# Patient Record
Sex: MALE | Race: WHITE | NOT HISPANIC OR LATINO
[De-identification: names, ages, dates, MRNs, and addresses within clinical notes are randomized per-mention and may not be internally consistent; named-entity substitution may affect disease eponyms.]

---

## 2017-01-13 ENCOUNTER — APPOINTMENT (OUTPATIENT)
Dept: SURGERY | Facility: CLINIC | Age: 57
End: 2017-01-13

## 2017-01-13 VITALS
BODY MASS INDEX: 26.34 KG/M2 | HEIGHT: 70 IN | OXYGEN SATURATION: 98 % | WEIGHT: 184.03 LBS | HEART RATE: 70 BPM | RESPIRATION RATE: 16 BRPM | DIASTOLIC BLOOD PRESSURE: 76 MMHG | TEMPERATURE: 98 F | SYSTOLIC BLOOD PRESSURE: 149 MMHG

## 2017-01-20 ENCOUNTER — APPOINTMENT (OUTPATIENT)
Dept: SURGERY | Facility: CLINIC | Age: 57
End: 2017-01-20

## 2017-01-20 VITALS
TEMPERATURE: 98.7 F | BODY MASS INDEX: 26.63 KG/M2 | RESPIRATION RATE: 16 BRPM | DIASTOLIC BLOOD PRESSURE: 69 MMHG | OXYGEN SATURATION: 99 % | WEIGHT: 186.03 LBS | HEART RATE: 60 BPM | HEIGHT: 70 IN | SYSTOLIC BLOOD PRESSURE: 148 MMHG

## 2017-02-03 ENCOUNTER — APPOINTMENT (OUTPATIENT)
Dept: SURGERY | Facility: CLINIC | Age: 57
End: 2017-02-03

## 2017-02-03 VITALS
WEIGHT: 185.03 LBS | DIASTOLIC BLOOD PRESSURE: 68 MMHG | BODY MASS INDEX: 26.49 KG/M2 | HEIGHT: 70 IN | HEART RATE: 65 BPM | RESPIRATION RATE: 16 BRPM | OXYGEN SATURATION: 98 % | TEMPERATURE: 98.1 F | SYSTOLIC BLOOD PRESSURE: 144 MMHG

## 2017-03-03 ENCOUNTER — APPOINTMENT (OUTPATIENT)
Dept: SURGERY | Facility: CLINIC | Age: 57
End: 2017-03-03

## 2017-03-03 ENCOUNTER — APPOINTMENT (OUTPATIENT)
Dept: VASCULAR SURGERY | Facility: CLINIC | Age: 57
End: 2017-03-03

## 2017-03-03 VITALS
DIASTOLIC BLOOD PRESSURE: 63 MMHG | OXYGEN SATURATION: 99 % | HEART RATE: 57 BPM | HEIGHT: 70 IN | SYSTOLIC BLOOD PRESSURE: 152 MMHG | WEIGHT: 178 LBS | TEMPERATURE: 98.3 F | BODY MASS INDEX: 25.48 KG/M2 | RESPIRATION RATE: 16 BRPM

## 2017-04-12 ENCOUNTER — APPOINTMENT (OUTPATIENT)
Dept: SURGERY | Facility: CLINIC | Age: 57
End: 2017-04-12

## 2017-04-12 VITALS
RESPIRATION RATE: 16 BRPM | OXYGEN SATURATION: 94 % | BODY MASS INDEX: 25.62 KG/M2 | DIASTOLIC BLOOD PRESSURE: 74 MMHG | SYSTOLIC BLOOD PRESSURE: 147 MMHG | WEIGHT: 179 LBS | TEMPERATURE: 98.4 F | HEIGHT: 70 IN | HEART RATE: 84 BPM

## 2017-04-12 DIAGNOSIS — K43.2 INCISIONAL HERNIA W/OUT OBSTRUCTION OR GANGRENE: ICD-10-CM

## 2017-04-12 DIAGNOSIS — K42.9 UMBILICAL HERNIA W/OUT OBSTRUCTION OR GANGRENE: ICD-10-CM

## 2017-10-09 ENCOUNTER — APPOINTMENT (OUTPATIENT)
Dept: SURGERY | Facility: CLINIC | Age: 57
End: 2017-10-09

## 2018-02-01 ENCOUNTER — EMERGENCY (EMERGENCY)
Facility: HOSPITAL | Age: 58
LOS: 1 days | Discharge: HOME | End: 2018-02-01
Attending: EMERGENCY MEDICINE | Admitting: EMERGENCY MEDICINE
Payer: MEDICAID

## 2018-02-01 VITALS
HEART RATE: 81 BPM | SYSTOLIC BLOOD PRESSURE: 131 MMHG | TEMPERATURE: 98 F | DIASTOLIC BLOOD PRESSURE: 65 MMHG | OXYGEN SATURATION: 100 %

## 2018-02-01 VITALS
HEIGHT: 70 IN | DIASTOLIC BLOOD PRESSURE: 100 MMHG | SYSTOLIC BLOOD PRESSURE: 158 MMHG | TEMPERATURE: 98 F | RESPIRATION RATE: 18 BRPM | WEIGHT: 199.96 LBS | HEART RATE: 86 BPM | OXYGEN SATURATION: 98 %

## 2018-02-01 DIAGNOSIS — Z96.7 PRESENCE OF OTHER BONE AND TENDON IMPLANTS: Chronic | ICD-10-CM

## 2018-02-01 DIAGNOSIS — Z98.890 OTHER SPECIFIED POSTPROCEDURAL STATES: Chronic | ICD-10-CM

## 2018-02-01 LAB
ALBUMIN SERPL ELPH-MCNC: 4.4 G/DL — SIGNIFICANT CHANGE UP (ref 3.3–5.2)
ALP SERPL-CCNC: 74 U/L — SIGNIFICANT CHANGE UP (ref 40–120)
ALT FLD-CCNC: 17 U/L — SIGNIFICANT CHANGE UP
AMPHET UR-MCNC: NEGATIVE — SIGNIFICANT CHANGE UP
ANION GAP SERPL CALC-SCNC: 14 MMOL/L — SIGNIFICANT CHANGE UP (ref 5–17)
APAP SERPL-MCNC: <7.5 UG/ML — LOW (ref 10–26)
AST SERPL-CCNC: 24 U/L — SIGNIFICANT CHANGE UP
BARBITURATES UR SCN-MCNC: NEGATIVE — SIGNIFICANT CHANGE UP
BASOPHILS # BLD AUTO: 0 K/UL — SIGNIFICANT CHANGE UP (ref 0–0.2)
BASOPHILS NFR BLD AUTO: 0.5 % — SIGNIFICANT CHANGE UP (ref 0–2)
BENZODIAZ UR-MCNC: NEGATIVE — SIGNIFICANT CHANGE UP
BILIRUB SERPL-MCNC: 0.4 MG/DL — SIGNIFICANT CHANGE UP (ref 0.4–2)
BUN SERPL-MCNC: 14 MG/DL — SIGNIFICANT CHANGE UP (ref 8–20)
CALCIUM SERPL-MCNC: 9.2 MG/DL — SIGNIFICANT CHANGE UP (ref 8.6–10.2)
CHLORIDE SERPL-SCNC: 102 MMOL/L — SIGNIFICANT CHANGE UP (ref 98–107)
CO2 SERPL-SCNC: 24 MMOL/L — SIGNIFICANT CHANGE UP (ref 22–29)
COCAINE METAB.OTHER UR-MCNC: POSITIVE
CREAT SERPL-MCNC: 1.35 MG/DL — HIGH (ref 0.5–1.3)
EOSINOPHIL # BLD AUTO: 0.3 K/UL — SIGNIFICANT CHANGE UP (ref 0–0.5)
EOSINOPHIL NFR BLD AUTO: 2.4 % — SIGNIFICANT CHANGE UP (ref 0–5)
ETHANOL SERPL-MCNC: <10 MG/DL — SIGNIFICANT CHANGE UP
GLUCOSE SERPL-MCNC: 166 MG/DL — HIGH (ref 70–115)
HCT VFR BLD CALC: 39.6 % — LOW (ref 42–52)
HGB BLD-MCNC: 12.4 G/DL — LOW (ref 14–18)
LYMPHOCYTES # BLD AUTO: 27.7 % — SIGNIFICANT CHANGE UP (ref 20–55)
LYMPHOCYTES # BLD AUTO: 3 K/UL — SIGNIFICANT CHANGE UP (ref 1–4.8)
MCHC RBC-ENTMCNC: 26.8 PG — LOW (ref 27–31)
MCHC RBC-ENTMCNC: 31.3 G/DL — LOW (ref 32–36)
MCV RBC AUTO: 85.5 FL — SIGNIFICANT CHANGE UP (ref 80–94)
METHADONE UR-MCNC: NEGATIVE — SIGNIFICANT CHANGE UP
MONOCYTES # BLD AUTO: 0.8 K/UL — SIGNIFICANT CHANGE UP (ref 0–0.8)
MONOCYTES NFR BLD AUTO: 7.1 % — SIGNIFICANT CHANGE UP (ref 3–10)
NEUTROPHILS # BLD AUTO: 6.6 K/UL — SIGNIFICANT CHANGE UP (ref 1.8–8)
NEUTROPHILS NFR BLD AUTO: 61.7 % — SIGNIFICANT CHANGE UP (ref 37–73)
OPIATES UR-MCNC: POSITIVE
PCP SPEC-MCNC: SIGNIFICANT CHANGE UP
PCP UR-MCNC: NEGATIVE — SIGNIFICANT CHANGE UP
PLATELET # BLD AUTO: 417 K/UL — HIGH (ref 150–400)
POTASSIUM SERPL-MCNC: 3.5 MMOL/L — SIGNIFICANT CHANGE UP (ref 3.5–5.3)
POTASSIUM SERPL-SCNC: 3.5 MMOL/L — SIGNIFICANT CHANGE UP (ref 3.5–5.3)
PROT SERPL-MCNC: 7.6 G/DL — SIGNIFICANT CHANGE UP (ref 6.6–8.7)
RBC # BLD: 4.63 M/UL — SIGNIFICANT CHANGE UP (ref 4.6–6.2)
RBC # FLD: 14.2 % — SIGNIFICANT CHANGE UP (ref 11–15.6)
SALICYLATES SERPL-MCNC: <0.6 MG/DL — LOW (ref 10–20)
SODIUM SERPL-SCNC: 140 MMOL/L — SIGNIFICANT CHANGE UP (ref 135–145)
THC UR QL: POSITIVE
WBC # BLD: 10.7 K/UL — SIGNIFICANT CHANGE UP (ref 4.8–10.8)
WBC # FLD AUTO: 10.7 K/UL — SIGNIFICANT CHANGE UP (ref 4.8–10.8)

## 2018-02-01 PROCEDURE — 99283 EMERGENCY DEPT VISIT LOW MDM: CPT | Mod: 25

## 2018-02-01 PROCEDURE — 93010 ELECTROCARDIOGRAM REPORT: CPT

## 2018-02-01 PROCEDURE — 93005 ELECTROCARDIOGRAM TRACING: CPT

## 2018-02-01 PROCEDURE — 71045 X-RAY EXAM CHEST 1 VIEW: CPT

## 2018-02-01 PROCEDURE — 36415 COLL VENOUS BLD VENIPUNCTURE: CPT

## 2018-02-01 PROCEDURE — 99285 EMERGENCY DEPT VISIT HI MDM: CPT

## 2018-02-01 PROCEDURE — 71045 X-RAY EXAM CHEST 1 VIEW: CPT | Mod: 26

## 2018-02-01 PROCEDURE — 94640 AIRWAY INHALATION TREATMENT: CPT

## 2018-02-01 PROCEDURE — 85027 COMPLETE CBC AUTOMATED: CPT

## 2018-02-01 PROCEDURE — 80053 COMPREHEN METABOLIC PANEL: CPT

## 2018-02-01 PROCEDURE — 80307 DRUG TEST PRSMV CHEM ANLYZR: CPT

## 2018-02-01 RX ORDER — IPRATROPIUM/ALBUTEROL SULFATE 18-103MCG
3 AEROSOL WITH ADAPTER (GRAM) INHALATION ONCE
Qty: 0 | Refills: 0 | Status: COMPLETED | OUTPATIENT
Start: 2018-02-01 | End: 2018-02-01

## 2018-02-01 RX ADMIN — Medication 3 MILLILITER(S): at 14:18

## 2018-02-01 NOTE — SBIRT NOTE. - NSSBIRTSERVICES_GEN_A_ED_FT
Naloxone Rescue Kit dispensed: Pt was educated about Naloxone and trained on how to assemble and utilize the kit. [place] [id number], [facility]  Provided SBIRT services: Full screen positive. Referral to Treatment Performed. Screening results were reviewed with the patient and patient was provided information about healthy guidelines and potential negative consequences associated with level of risk. Motivation and readiness to reduce or stop use was discussed and goals and activities to make changes were suggested/offered.  Referral for complete assessment and level of care determination at a certified treatment facility was completed by contacting the treatment facility via phone, and add apt info as noted below:  Appt confirmed at [facility]  Audit Score: 0  DAST Score: 7  Duration = 35 Minutes

## 2018-02-01 NOTE — ED PROVIDER NOTE - OBJECTIVE STATEMENT
58 y/o M pt with hx of asthma presents to ED c/o overdose. He admits to heroin use this morning through a line. He felt SOB at work and someone called EMS. 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 No chest pain

## 2018-02-01 NOTE — ED ADULT NURSE NOTE - OBJECTIVE STATEMENT
pt c/o smoking thc this am around 10-12 am, snorting herion.  per ems/wife pt was acting strangly at work so they called 911.

## 2018-02-01 NOTE — ED ADULT TRIAGE NOTE - CHIEF COMPLAINT QUOTE
pt alert and awake x3, BIBA incoherent speech, as per ems, pt was found in office of work sleeping on and off, pt admits to smoking marijuana. pt disrobed and placed in yellow gown, belongings locked up.

## 2018-02-01 NOTE — SBIRT NOTE. - NSSBIRTSERVICES_GEN_A_ED
Brief Intervention Performed/Full Screen ONLY/Referral to Treatment Provided/Smoking Cessation Information Provided

## 2018-02-01 NOTE — ED PROVIDER NOTE - PSH
S/P hernia surgery    S/P ORIF (open reduction internal fixation) fracture  L shoulder  S/P shoulder surgery  R

## 2018-12-05 ENCOUNTER — EMERGENCY (EMERGENCY)
Facility: HOSPITAL | Age: 58
LOS: 1 days | Discharge: HOME | End: 2018-12-05
Attending: EMERGENCY MEDICINE
Payer: MEDICAID

## 2018-12-05 VITALS
RESPIRATION RATE: 18 BRPM | HEART RATE: 80 BPM | TEMPERATURE: 98 F | DIASTOLIC BLOOD PRESSURE: 79 MMHG | OXYGEN SATURATION: 97 % | SYSTOLIC BLOOD PRESSURE: 135 MMHG

## 2018-12-05 VITALS
SYSTOLIC BLOOD PRESSURE: 188 MMHG | TEMPERATURE: 99 F | RESPIRATION RATE: 16 BRPM | DIASTOLIC BLOOD PRESSURE: 91 MMHG | OXYGEN SATURATION: 97 % | HEART RATE: 107 BPM

## 2018-12-05 DIAGNOSIS — Z96.7 PRESENCE OF OTHER BONE AND TENDON IMPLANTS: Chronic | ICD-10-CM

## 2018-12-05 DIAGNOSIS — Z98.890 OTHER SPECIFIED POSTPROCEDURAL STATES: Chronic | ICD-10-CM

## 2018-12-05 LAB
ALBUMIN SERPL ELPH-MCNC: 4.1 G/DL — SIGNIFICANT CHANGE UP (ref 3.3–5.2)
ALP SERPL-CCNC: 63 U/L — SIGNIFICANT CHANGE UP (ref 40–120)
ALT FLD-CCNC: 15 U/L — SIGNIFICANT CHANGE UP
ANION GAP SERPL CALC-SCNC: 14 MMOL/L — SIGNIFICANT CHANGE UP (ref 5–17)
APPEARANCE UR: CLEAR — SIGNIFICANT CHANGE UP
AST SERPL-CCNC: 20 U/L — SIGNIFICANT CHANGE UP
BACTERIA # UR AUTO: ABNORMAL
BASOPHILS # BLD AUTO: 0 K/UL — SIGNIFICANT CHANGE UP (ref 0–0.2)
BASOPHILS NFR BLD AUTO: 0.2 % — SIGNIFICANT CHANGE UP (ref 0–2)
BILIRUB SERPL-MCNC: 0.3 MG/DL — LOW (ref 0.4–2)
BILIRUB UR-MCNC: NEGATIVE — SIGNIFICANT CHANGE UP
BUN SERPL-MCNC: 13 MG/DL — SIGNIFICANT CHANGE UP (ref 8–20)
CALCIUM SERPL-MCNC: 9.2 MG/DL — SIGNIFICANT CHANGE UP (ref 8.6–10.2)
CHLORIDE SERPL-SCNC: 105 MMOL/L — SIGNIFICANT CHANGE UP (ref 98–107)
CO2 SERPL-SCNC: 23 MMOL/L — SIGNIFICANT CHANGE UP (ref 22–29)
COLOR SPEC: YELLOW — SIGNIFICANT CHANGE UP
CREAT SERPL-MCNC: 1.44 MG/DL — HIGH (ref 0.5–1.3)
DIFF PNL FLD: ABNORMAL
EOSINOPHIL # BLD AUTO: 0 K/UL — SIGNIFICANT CHANGE UP (ref 0–0.5)
EOSINOPHIL NFR BLD AUTO: 0.3 % — SIGNIFICANT CHANGE UP (ref 0–5)
EPI CELLS # UR: SIGNIFICANT CHANGE UP
GLUCOSE SERPL-MCNC: 102 MG/DL — SIGNIFICANT CHANGE UP (ref 70–115)
GLUCOSE UR QL: NEGATIVE MG/DL — SIGNIFICANT CHANGE UP
HCT VFR BLD CALC: 37.7 % — LOW (ref 42–52)
HGB BLD-MCNC: 12 G/DL — LOW (ref 14–18)
KETONES UR-MCNC: ABNORMAL
LEUKOCYTE ESTERASE UR-ACNC: NEGATIVE — SIGNIFICANT CHANGE UP
LYMPHOCYTES # BLD AUTO: 1.4 K/UL — SIGNIFICANT CHANGE UP (ref 1–4.8)
LYMPHOCYTES # BLD AUTO: 7.9 % — LOW (ref 20–55)
MCHC RBC-ENTMCNC: 26.8 PG — LOW (ref 27–31)
MCHC RBC-ENTMCNC: 31.8 G/DL — LOW (ref 32–36)
MCV RBC AUTO: 84.2 FL — SIGNIFICANT CHANGE UP (ref 80–94)
MONOCYTES # BLD AUTO: 1.4 K/UL — HIGH (ref 0–0.8)
MONOCYTES NFR BLD AUTO: 8.1 % — SIGNIFICANT CHANGE UP (ref 3–10)
NEUTROPHILS # BLD AUTO: 14.5 K/UL — HIGH (ref 1.8–8)
NEUTROPHILS NFR BLD AUTO: 83 % — HIGH (ref 37–73)
NITRITE UR-MCNC: NEGATIVE — SIGNIFICANT CHANGE UP
NT-PROBNP SERPL-SCNC: 90 PG/ML — SIGNIFICANT CHANGE UP (ref 0–300)
PH UR: 5 — SIGNIFICANT CHANGE UP (ref 5–8)
PLATELET # BLD AUTO: 333 K/UL — SIGNIFICANT CHANGE UP (ref 150–400)
POTASSIUM SERPL-MCNC: 4.2 MMOL/L — SIGNIFICANT CHANGE UP (ref 3.5–5.3)
POTASSIUM SERPL-SCNC: 4.2 MMOL/L — SIGNIFICANT CHANGE UP (ref 3.5–5.3)
PROT SERPL-MCNC: 7 G/DL — SIGNIFICANT CHANGE UP (ref 6.6–8.7)
PROT UR-MCNC: 30 MG/DL
RBC # BLD: 4.48 M/UL — LOW (ref 4.6–6.2)
RBC # FLD: 13.9 % — SIGNIFICANT CHANGE UP (ref 11–15.6)
RBC CASTS # UR COMP ASSIST: SIGNIFICANT CHANGE UP /HPF (ref 0–4)
SODIUM SERPL-SCNC: 142 MMOL/L — SIGNIFICANT CHANGE UP (ref 135–145)
SP GR SPEC: 1.02 — SIGNIFICANT CHANGE UP (ref 1.01–1.02)
UROBILINOGEN FLD QL: NEGATIVE MG/DL — SIGNIFICANT CHANGE UP
WBC # BLD: 17.5 K/UL — HIGH (ref 4.8–10.8)
WBC # FLD AUTO: 17.5 K/UL — HIGH (ref 4.8–10.8)
WBC UR QL: SIGNIFICANT CHANGE UP

## 2018-12-05 PROCEDURE — 71046 X-RAY EXAM CHEST 2 VIEWS: CPT | Mod: 26

## 2018-12-05 PROCEDURE — 93010 ELECTROCARDIOGRAM REPORT: CPT

## 2018-12-05 PROCEDURE — 81001 URINALYSIS AUTO W/SCOPE: CPT

## 2018-12-05 PROCEDURE — 93005 ELECTROCARDIOGRAM TRACING: CPT

## 2018-12-05 PROCEDURE — 71046 X-RAY EXAM CHEST 2 VIEWS: CPT

## 2018-12-05 PROCEDURE — 74177 CT ABD & PELVIS W/CONTRAST: CPT | Mod: 26

## 2018-12-05 PROCEDURE — 94640 AIRWAY INHALATION TREATMENT: CPT

## 2018-12-05 PROCEDURE — 74177 CT ABD & PELVIS W/CONTRAST: CPT

## 2018-12-05 PROCEDURE — 99284 EMERGENCY DEPT VISIT MOD MDM: CPT | Mod: 25

## 2018-12-05 PROCEDURE — 83880 ASSAY OF NATRIURETIC PEPTIDE: CPT

## 2018-12-05 PROCEDURE — 80053 COMPREHEN METABOLIC PANEL: CPT

## 2018-12-05 PROCEDURE — 36415 COLL VENOUS BLD VENIPUNCTURE: CPT

## 2018-12-05 PROCEDURE — 99285 EMERGENCY DEPT VISIT HI MDM: CPT

## 2018-12-05 PROCEDURE — 85027 COMPLETE CBC AUTOMATED: CPT

## 2018-12-05 RX ORDER — SODIUM CHLORIDE 9 MG/ML
1000 INJECTION INTRAMUSCULAR; INTRAVENOUS; SUBCUTANEOUS ONCE
Qty: 0 | Refills: 0 | Status: COMPLETED | OUTPATIENT
Start: 2018-12-05 | End: 2018-12-05

## 2018-12-05 RX ORDER — IPRATROPIUM/ALBUTEROL SULFATE 18-103MCG
3 AEROSOL WITH ADAPTER (GRAM) INHALATION ONCE
Qty: 0 | Refills: 0 | Status: DISCONTINUED | OUTPATIENT
Start: 2018-12-05 | End: 2018-12-05

## 2018-12-05 RX ORDER — SODIUM CHLORIDE 9 MG/ML
3 INJECTION INTRAMUSCULAR; INTRAVENOUS; SUBCUTANEOUS ONCE
Qty: 0 | Refills: 0 | Status: COMPLETED | OUTPATIENT
Start: 2018-12-05 | End: 2018-12-05

## 2018-12-05 RX ORDER — IPRATROPIUM/ALBUTEROL SULFATE 18-103MCG
3 AEROSOL WITH ADAPTER (GRAM) INHALATION ONCE
Qty: 0 | Refills: 0 | Status: COMPLETED | OUTPATIENT
Start: 2018-12-05 | End: 2018-12-05

## 2018-12-05 RX ADMIN — SODIUM CHLORIDE 3 MILLILITER(S): 9 INJECTION INTRAMUSCULAR; INTRAVENOUS; SUBCUTANEOUS at 19:15

## 2018-12-05 RX ADMIN — SODIUM CHLORIDE 1000 MILLILITER(S): 9 INJECTION INTRAMUSCULAR; INTRAVENOUS; SUBCUTANEOUS at 19:16

## 2018-12-05 RX ADMIN — Medication 3 MILLILITER(S): at 19:15

## 2018-12-05 NOTE — ED ADULT NURSE NOTE - OBJECTIVE STATEMENT
"I wasn't feeling right at work. I was sweaty and couldn't stay awake." pt admits to triage rn that he takes Dilaudid for chronic pain. pt denying that he takes narcotic pain medication of any kind to this rn at time of assessment. pt denies use of any drugs or etoh. a and o x3 at this time. calm and cooperative. pt has no other complaints at this time. wife at bedside. will continue to monitor.

## 2018-12-05 NOTE — ED PROVIDER NOTE - MEDICAL DECISION MAKING DETAILS
58 y M with generalized weakness, ligheadness, that resolved, will check electrolyte, IVF for hydration, duobneb for wheezing, cxr to r/o pneumonia, CT abdomen to assess for left groin hernia.

## 2018-12-05 NOTE — ED ADULT TRIAGE NOTE - CHIEF COMPLAINT QUOTE
Pt BIBA, voices no complaints. Co-worker called 911 because pt was dozing off at work. Pt dozing in triage, easy to arouse to verbal and tactile stimuli. Pt denies using drugs or ETOH, states " I take dilaudid for pain, I took it this morning." Pt denies taking any extra doses. Pupils are constricted.

## 2018-12-05 NOTE — ED PROVIDER NOTE - NS ED ROS FT
Review of Systems  •	CONSTITUTIONAL - no  fever, no diaphoresis, no weight change  •	SKIN - no rash  •	HEMATOLOGIC - no bleeding, no bruising  •	EYES - no eye pain, no blurred vision  •	ENT - no change in hearing, no pain  •	RESPIRATORY - no shortness of breath, no cough  •	CARDIAC - no chest pain, no palpitations  •	GI - (+) left groin  abd pain, no nausea, no vomiting, no diarrhea, no constipation, no bleeding  •	GENITO-URINARY - no discharge, no dysuria; no hematuria,   •	ENDO - no polydypsia, no polyurea, no heat/no cold intolerance  •	MUSCULOSKELETAL - no joint pain, no swelling, no redness  •	NEUROLOGIC - (+) weakness, (+) headache, no anesthesia, no paresthesias  •	PSYCH - no anxiety, non suicidal, non homicidal, no hallucination, no depression

## 2018-12-05 NOTE — ED ADULT NURSE NOTE - NSIMPLEMENTINTERV_GEN_ALL_ED
Implemented All Fall Risk Interventions:  [place] to call system. Call bell, personal items and telephone within reach. Instruct patient to call for assistance. Room bathroom lighting operational. Non-slip footwear when patient is off stretcher. Physically safe environment: no spills, clutter or unnecessary equipment. Stretcher in lowest position, wheels locked, appropriate side rails in place. Provide visual cue, wrist band, yellow gown, etc. Monitor gait and stability. Monitor for mental status changes and reorient to person, place, and time. Review medications for side effects contributing to fall risk. Reinforce activity limits and safety measures with patient and family.

## 2018-12-05 NOTE — ED PROVIDER NOTE - PROGRESS NOTE DETAILS
CT showed non-obstructing hernia, easily reducible at bedside. WBC 17, maybe be due to steroid inhaler use. No signes of erythema and cellulitis of the hernia. Patient will follow up with his surgeon Dr. Mckeon for hernia repair. CT showed non-obstructing hernia, easily reducible at bedside. WBC 17, maybe be due to steroid inhaler use. No signes of erythema and cellulitis of the hernia. Patient will follow up with his surgeon Dr. Mckeon for hernia repair. cxr showed ?infiltrate, in the setting of crakle of right lung, will treat with Z-pack.

## 2018-12-05 NOTE — ED PROVIDER NOTE - PHYSICAL EXAMINATION
VITAL SIGNS: I have reviewed nursing notes and confirm.  CONSTITUTIONAL: Well-developed; well-nourished; (+) anxious   SKIN: Skin exam is warm and dry, no acute rash.  HEAD: Normocephalic; atraumatic.  EYES: PERRL, EOM intact; conjunctiva and sclera clear.  ENT: No nasal discharge; airway clear. Throat clear.  NECK: Supple; non tender.  No lymphadenopathy.  CARD: S1, S2 normal; no murmurs, gallops, or rubs. Regular rate and rhythm.  RESP: (+) mild b/l wheezes,  (+) crackle of right base.   ABD: Normal bowel sounds; soft;(+) mass in left groin ; no hepatosplenomegaly.  EXT: Normal ROM. No clubbing, cyanosis or edema.  NEURO: Alert, oriented. Grossly unremarkable. No focal deficits. no facial droop, moves all extremities,   PSYCH: Cooperative, appropriate.

## 2018-12-05 NOTE — ED PROVIDER NOTE - OBJECTIVE STATEMENT
59 yo M hx of asthma p/w generalized weakness. He report today he while at work, he bend down and tripped over a wire, maybe have hit his head, but no loc. Afterwards, he felt weak, "feeling hot", lightheadache and his co-worker call the ambulance. He report that he takes "dulera" for his chronic asthma but he was mumbling and the triage RN thought he takes "diludid". patient denies ever been on pain medication. He smokes marijuana daily and never similar reaction. Today at work, he smoked with his co-worker. He also report left groin mass, worse when he coughs and strains. He had prior hernia repair by . He had some nausea and vomiting before. Currently able to tolerate PO and passing gas.

## 2018-12-05 NOTE — ED ADULT NURSE NOTE - CHPI ED NUR SYMPTOMS NEG
no chills/no fever/no disorientation/no abdominal pain/no pain/no abdominal distension/no vomiting/no nausea/no weakness/no confusion

## 2018-12-06 RX ORDER — AZITHROMYCIN 500 MG/1
1 TABLET, FILM COATED ORAL
Qty: 1 | Refills: 0
Start: 2018-12-06 | End: 2018-12-10

## 2018-12-07 ENCOUNTER — APPOINTMENT (OUTPATIENT)
Dept: SURGERY | Facility: CLINIC | Age: 58
End: 2018-12-07
Payer: MEDICAID

## 2018-12-07 VITALS
WEIGHT: 183 LBS | SYSTOLIC BLOOD PRESSURE: 127 MMHG | BODY MASS INDEX: 26.2 KG/M2 | TEMPERATURE: 97.6 F | HEIGHT: 70 IN | OXYGEN SATURATION: 99 % | DIASTOLIC BLOOD PRESSURE: 76 MMHG | HEART RATE: 69 BPM

## 2018-12-07 PROCEDURE — 99214 OFFICE O/P EST MOD 30 MIN: CPT

## 2019-02-08 ENCOUNTER — APPOINTMENT (OUTPATIENT)
Dept: SURGERY | Facility: CLINIC | Age: 59
End: 2019-02-08
Payer: MEDICAID

## 2019-02-08 VITALS
HEART RATE: 67 BPM | RESPIRATION RATE: 16 BRPM | SYSTOLIC BLOOD PRESSURE: 128 MMHG | BODY MASS INDEX: 26.49 KG/M2 | OXYGEN SATURATION: 98 % | HEIGHT: 70 IN | WEIGHT: 185.03 LBS | DIASTOLIC BLOOD PRESSURE: 77 MMHG | TEMPERATURE: 98 F

## 2019-02-08 PROCEDURE — 99214 OFFICE O/P EST MOD 30 MIN: CPT

## 2019-02-08 RX ORDER — MOMETASONE FUROATE AND FORMOTEROL FUMARATE DIHYDRATE 200; 5 UG/1; UG/1
200-5 AEROSOL RESPIRATORY (INHALATION)
Refills: 0 | Status: ACTIVE | COMMUNITY

## 2019-02-08 NOTE — HISTORY OF PRESENT ILLNESS
[de-identified] : The patient states that he has been having only intermittent burning pain in the left groin.  He notes no changes in the size of the hernia.  He has no abdominal pain, nausea, and no vomit. He has not lost any weight and states he is still trying to lose weight.

## 2019-02-08 NOTE — ASSESSMENT
[FreeTextEntry1] : The patient is a 58 year old male with intermittent left groin pain and a stable left inguinal hernia.  He wishes to continue to lose weight.  I have agrees that weight loss will help reduce the potential complications of recurrent hernias, chronic post operative pain, and infection. The patient at this point will follow up in 3 months time or sooner should any problems or issues arise.

## 2019-02-08 NOTE — PHYSICAL EXAM
[No Rash or Lesion] : No rash or lesion [Purpura] : no purpura  [Petechiae] : no petechiae [Skin Ulcer] : no ulcer [Skin Induration] : no induration [Alert] : alert [Oriented to Person] : oriented to person [Oriented to Place] : oriented to place [Oriented to Time] : oriented to time [Calm] : calm [de-identified] : non toxic, in no acute distress,  the patient's weight is 198 pounds [de-identified] : NC/AT PERRL EOMI no scleral icterus [de-identified] : soft, no localizing tenderness, no guarding, no rebound, no masses [de-identified] : FROM of all extremities, no signs of recurrent ventral and/or umbilical hernias, there is a stable reducible left inguinal hernia with no significant associated tenderness, there is no right ingiunal hernia [de-identified] : old surgical scars are healed without ulceration [de-identified] : mood is calm

## 2019-04-05 ENCOUNTER — APPOINTMENT (OUTPATIENT)
Dept: SURGERY | Facility: CLINIC | Age: 59
End: 2019-04-05
Payer: MEDICAID

## 2019-04-05 VITALS
TEMPERATURE: 97.5 F | HEART RATE: 53 BPM | DIASTOLIC BLOOD PRESSURE: 75 MMHG | WEIGHT: 193 LBS | SYSTOLIC BLOOD PRESSURE: 126 MMHG | BODY MASS INDEX: 27.63 KG/M2 | OXYGEN SATURATION: 98 % | HEIGHT: 70 IN

## 2019-04-05 PROCEDURE — 99214 OFFICE O/P EST MOD 30 MIN: CPT

## 2019-04-05 NOTE — ASSESSMENT
[FreeTextEntry1] : The patient is an obese male with a stable left inguinal hernia.  The patient has no sign of recurrent ventral hernia and at this time will continue his efforts at weight loss.  He will follow up in 3 months time or sooner should any problems or issues arise. The patient has been advised that weight loss will be an important adjunct to help potentially reduce the risks of infection, hernia recurrence, and chronic post operative pain associated with surgery by potentially reducing the tension along the abdominal wall.   The patient was educated about the signs and symptoms of hernia strangulation and that should this occur they should seek immediate MD evaluation.\par \par

## 2019-04-05 NOTE — HISTORY OF PRESENT ILLNESS
[de-identified] : The patient returns to the office with no complaints of pain in the left or right groin.  He reports that he has gained more weight since his last visit despite trying to increase his overall level of activity.  The patient notes no growth or change in the hernia. He has no abdominal pain, nausea, or vomit.

## 2019-04-05 NOTE — PHYSICAL EXAM
[No Rash or Lesion] : No rash or lesion [Purpura] : no purpura  [Petechiae] : no petechiae [Skin Ulcer] : no ulcer [Skin Induration] : no induration [Alert] : alert [Oriented to Person] : oriented to person [Oriented to Place] : oriented to place [Oriented to Time] : oriented to time [Calm] : calm [de-identified] : non toxic, in no acute distress,  the patient's weight is 198 pounds [de-identified] : NC/AT PERRL EOMI no scleral icterus [de-identified] : soft, no localizing tenderness, no guarding, no rebound, no masses [de-identified] : FROM of all extremities, there remains no signs of recurrent ventral and/or umbilical hernias, there remains a stable reducible left inguinal hernia that is freely reducible, three is no right inguinal hernia [de-identified] : old surgical scars are healed without ulceration [de-identified] : mood is calm

## 2019-07-12 ENCOUNTER — APPOINTMENT (OUTPATIENT)
Dept: SURGERY | Facility: CLINIC | Age: 59
End: 2019-07-12
Payer: MEDICAID

## 2019-07-12 VITALS
WEIGHT: 182 LBS | TEMPERATURE: 97.3 F | OXYGEN SATURATION: 94 % | HEIGHT: 70 IN | HEART RATE: 74 BPM | DIASTOLIC BLOOD PRESSURE: 83 MMHG | SYSTOLIC BLOOD PRESSURE: 148 MMHG | BODY MASS INDEX: 26.05 KG/M2

## 2019-07-12 PROCEDURE — 99214 OFFICE O/P EST MOD 30 MIN: CPT

## 2019-07-12 NOTE — ASSESSMENT
[FreeTextEntry1] : The patient is a 58 year old male with improved left groin pain and a stable left inguinal hernia.  He has been encouraged to continue his efforts at weight loss in preparation for eventual surgery.  The risks, benefits, and alternatives including the option of doing nothing to a Laparoscopic and possible open left inguinal hernia repair with mesh were discussed.  The potential complications including but not limited to infection, bleeding, hernia recurrence, chronic post-operative pain, and seroma formation were discussed.  The patient was educated regarding the signs and symptoms of hernia strangulation and advised to seek immediate MD evaluation should this occur.  The patient understands and wishes to proceed once he loses weight.  He will follow up in 3 months or sooner should any problems or issues arise.  \par \par \par \par

## 2019-07-12 NOTE — HISTORY OF PRESENT ILLNESS
[de-identified] : The patient returns to the office with complaints of left groin pain and a left inguinal hernia.  The patient reports that the pain is an intermittent burning pain only at times of exertion.  The patient denies any growth in the hernia.  He has no nausea, and no vomit.

## 2019-07-12 NOTE — PHYSICAL EXAM
[Purpura] : no purpura  [No Rash or Lesion] : No rash or lesion [Petechiae] : no petechiae [Skin Ulcer] : no ulcer [Skin Induration] : no induration [Alert] : alert [Oriented to Person] : oriented to person [Calm] : calm [Oriented to Place] : oriented to place [Oriented to Time] : oriented to time [de-identified] : NC/AT PERRL EOMI no scleral icterus [de-identified] : non toxic, in no acute distress,  the patient's weight is 198 pounds [de-identified] : soft, no localizing tenderness, no guarding, no rebound, no masses [de-identified] : FROM of all extremities, there remains no signs of recurrent ventral and/or umbilical hernias, there remains a stable reducible left inguinal hernia that is freely reducible, three is no right inguinal hernia [de-identified] : old surgical scars are healed without ulceration [de-identified] : mood is calm

## 2019-10-11 ENCOUNTER — APPOINTMENT (OUTPATIENT)
Dept: SURGERY | Facility: CLINIC | Age: 59
End: 2019-10-11
Payer: MEDICAID

## 2019-10-11 VITALS
HEART RATE: 78 BPM | TEMPERATURE: 97.8 F | SYSTOLIC BLOOD PRESSURE: 101 MMHG | OXYGEN SATURATION: 96 % | DIASTOLIC BLOOD PRESSURE: 57 MMHG | HEIGHT: 70 IN | BODY MASS INDEX: 27.92 KG/M2 | RESPIRATION RATE: 16 BRPM | WEIGHT: 195 LBS

## 2019-10-11 VITALS — SYSTOLIC BLOOD PRESSURE: 99 MMHG | DIASTOLIC BLOOD PRESSURE: 61 MMHG

## 2019-10-11 PROCEDURE — 99214 OFFICE O/P EST MOD 30 MIN: CPT

## 2019-10-11 NOTE — PHYSICAL EXAM
[No Rash or Lesion] : No rash or lesion [Purpura] : no purpura  [Petechiae] : no petechiae [Skin Ulcer] : no ulcer [Skin Induration] : no induration [Alert] : alert [Oriented to Person] : oriented to person [Oriented to Place] : oriented to place [Oriented to Time] : oriented to time [Calm] : calm [de-identified] : non toxic, in no acute distress,  the patient's weight is 198 pounds [de-identified] : NC/AT PERRL EOMI no scleral icterus [de-identified] : soft, no localizing tenderness, no guarding, no rebound, no masses [de-identified] : FROM of all extremities, there remains no signs of recurrent ventral and/or umbilical hernias, there has been interval increase in the size of the  reducible left inguinal hernia that is freely reducible, three is no right inguinal hernia [de-identified] : old surgical scars are healed without ulceration [de-identified] : mood is calm

## 2019-10-11 NOTE — HISTORY OF PRESENT ILLNESS
[de-identified] : The patient returns to the office with an increase in the size of the hernia.  He reports that it still will reduce spontaneously but will pop out as soon as he stands up.  He has burning pain in the area.

## 2019-10-11 NOTE — ASSESSMENT
[FreeTextEntry1] : The patient is a 58 year old male with left groin pain and an enlarging left inguinal hernia.  He will benefit from a left inguinal hernia repair.  The risks, benefits, and alternatives including the option of doing nothing to a Laparoscopic and possible open left inguinal hernia repair with mesh were discussed.  The potential complications including but not limited to infection, bleeding, hernia recurrence, chronic post-operative pain, and seroma formation were discussed.  The patient was educated regarding the signs and symptoms of hernia strangulation and advised to seek immediate MD evaluation should this occur.  The patient understands and wishes to proceed at the next available time.  \par \par \par \par

## 2019-10-24 ENCOUNTER — OUTPATIENT (OUTPATIENT)
Dept: OUTPATIENT SERVICES | Facility: HOSPITAL | Age: 59
LOS: 1 days | End: 2019-10-24
Payer: MEDICAID

## 2019-10-24 DIAGNOSIS — Z98.890 OTHER SPECIFIED POSTPROCEDURAL STATES: Chronic | ICD-10-CM

## 2019-10-24 DIAGNOSIS — Z96.7 PRESENCE OF OTHER BONE AND TENDON IMPLANTS: Chronic | ICD-10-CM

## 2019-10-24 DIAGNOSIS — Z01.818 ENCOUNTER FOR OTHER PREPROCEDURAL EXAMINATION: ICD-10-CM

## 2019-10-24 LAB
ANION GAP SERPL CALC-SCNC: 13 MMOL/L — SIGNIFICANT CHANGE UP (ref 5–17)
BASOPHILS # BLD AUTO: 0.06 K/UL — SIGNIFICANT CHANGE UP (ref 0–0.2)
BASOPHILS NFR BLD AUTO: 0.7 % — SIGNIFICANT CHANGE UP (ref 0–2)
BUN SERPL-MCNC: 11 MG/DL — SIGNIFICANT CHANGE UP (ref 8–20)
CALCIUM SERPL-MCNC: 9.2 MG/DL — SIGNIFICANT CHANGE UP (ref 8.6–10.2)
CHLORIDE SERPL-SCNC: 104 MMOL/L — SIGNIFICANT CHANGE UP (ref 98–107)
CO2 SERPL-SCNC: 22 MMOL/L — SIGNIFICANT CHANGE UP (ref 22–29)
CREAT SERPL-MCNC: 1.07 MG/DL — SIGNIFICANT CHANGE UP (ref 0.5–1.3)
EOSINOPHIL # BLD AUTO: 0.22 K/UL — SIGNIFICANT CHANGE UP (ref 0–0.5)
EOSINOPHIL NFR BLD AUTO: 2.7 % — SIGNIFICANT CHANGE UP (ref 0–6)
GLUCOSE SERPL-MCNC: 89 MG/DL — SIGNIFICANT CHANGE UP (ref 70–115)
HBA1C BLD-MCNC: 5.6 % — SIGNIFICANT CHANGE UP (ref 4–5.6)
HCT VFR BLD CALC: 43.1 % — SIGNIFICANT CHANGE UP (ref 39–50)
HGB BLD-MCNC: 14 G/DL — SIGNIFICANT CHANGE UP (ref 13–17)
IMM GRANULOCYTES NFR BLD AUTO: 0.6 % — SIGNIFICANT CHANGE UP (ref 0–1.5)
LYMPHOCYTES # BLD AUTO: 1.71 K/UL — SIGNIFICANT CHANGE UP (ref 1–3.3)
LYMPHOCYTES # BLD AUTO: 20.7 % — SIGNIFICANT CHANGE UP (ref 13–44)
MCHC RBC-ENTMCNC: 27.8 PG — SIGNIFICANT CHANGE UP (ref 27–34)
MCHC RBC-ENTMCNC: 32.5 GM/DL — SIGNIFICANT CHANGE UP (ref 32–36)
MCV RBC AUTO: 85.7 FL — SIGNIFICANT CHANGE UP (ref 80–100)
MONOCYTES # BLD AUTO: 0.68 K/UL — SIGNIFICANT CHANGE UP (ref 0–0.9)
MONOCYTES NFR BLD AUTO: 8.2 % — SIGNIFICANT CHANGE UP (ref 2–14)
NEUTROPHILS # BLD AUTO: 5.56 K/UL — SIGNIFICANT CHANGE UP (ref 1.8–7.4)
NEUTROPHILS NFR BLD AUTO: 67.1 % — SIGNIFICANT CHANGE UP (ref 43–77)
PLATELET # BLD AUTO: 288 K/UL — SIGNIFICANT CHANGE UP (ref 150–400)
POTASSIUM SERPL-MCNC: 3.6 MMOL/L — SIGNIFICANT CHANGE UP (ref 3.5–5.3)
POTASSIUM SERPL-SCNC: 3.6 MMOL/L — SIGNIFICANT CHANGE UP (ref 3.5–5.3)
RBC # BLD: 5.03 M/UL — SIGNIFICANT CHANGE UP (ref 4.2–5.8)
RBC # FLD: 14.5 % — SIGNIFICANT CHANGE UP (ref 10.3–14.5)
SODIUM SERPL-SCNC: 139 MMOL/L — SIGNIFICANT CHANGE UP (ref 135–145)
WBC # BLD: 8.28 K/UL — SIGNIFICANT CHANGE UP (ref 3.8–10.5)
WBC # FLD AUTO: 8.28 K/UL — SIGNIFICANT CHANGE UP (ref 3.8–10.5)

## 2019-10-24 PROCEDURE — 83036 HEMOGLOBIN GLYCOSYLATED A1C: CPT

## 2019-10-24 PROCEDURE — 85027 COMPLETE CBC AUTOMATED: CPT

## 2019-10-24 PROCEDURE — G0463: CPT

## 2019-10-24 PROCEDURE — 93010 ELECTROCARDIOGRAM REPORT: CPT

## 2019-10-24 PROCEDURE — 93005 ELECTROCARDIOGRAM TRACING: CPT

## 2019-10-24 PROCEDURE — 80048 BASIC METABOLIC PNL TOTAL CA: CPT

## 2019-10-24 PROCEDURE — 36415 COLL VENOUS BLD VENIPUNCTURE: CPT

## 2019-11-05 PROCEDURE — 49650 LAP ING HERNIA REPAIR INIT: CPT | Mod: LT

## 2019-11-15 ENCOUNTER — APPOINTMENT (OUTPATIENT)
Dept: SURGERY | Facility: CLINIC | Age: 59
End: 2019-11-15
Payer: MEDICAID

## 2019-11-15 VITALS
DIASTOLIC BLOOD PRESSURE: 89 MMHG | OXYGEN SATURATION: 100 % | WEIGHT: 190 LBS | SYSTOLIC BLOOD PRESSURE: 150 MMHG | TEMPERATURE: 98.2 F | HEIGHT: 70 IN | BODY MASS INDEX: 27.2 KG/M2 | RESPIRATION RATE: 16 BRPM | HEART RATE: 86 BPM

## 2019-11-15 PROCEDURE — 99024 POSTOP FOLLOW-UP VISIT: CPT

## 2019-11-16 NOTE — PHYSICAL EXAM
[No Rash or Lesion] : No rash or lesion [Purpura] : no purpura  [Petechiae] : no petechiae [Skin Ulcer] : no ulcer [Alert] : alert [Skin Induration] : no induration [Oriented to Place] : oriented to place [Oriented to Person] : oriented to person [Calm] : calm [Oriented to Time] : oriented to time [de-identified] : NC/AT PERRL EOMI no scleral icterus [de-identified] : non toxic, in no acute distress,  the patient's weight is 198 pounds [de-identified] : soft, no localizing tenderness, no guarding, no rebound, no masses [de-identified] : FROM of all extremities, there remains no signs of recurrent ventral and/or umbilical hernias, there is no evidence of recurrent left inguinal hernia  [de-identified] : surgical incisions are without evidence of infection or inflammation  [de-identified] : mood is calm

## 2019-11-16 NOTE — ASSESSMENT
[FreeTextEntry1] : The patient is stable and doing well following a laparoscopic left inguinal hernia repair with mesh.  The patient will follow up in 2 weeks time or sooner should any problems or issues arise.

## 2019-11-16 NOTE — HISTORY OF PRESENT ILLNESS
[de-identified] : The patient returns to the office with no further left groin pain. The patient states that he has complete resolution of the pain he had prior to surgery.  The patient states he has mild swelling but no pain. He feels a little tightness in the area.

## 2019-11-22 ENCOUNTER — APPOINTMENT (OUTPATIENT)
Dept: SURGERY | Facility: CLINIC | Age: 59
End: 2019-11-22
Payer: MEDICAID

## 2019-11-22 VITALS
HEART RATE: 60 BPM | SYSTOLIC BLOOD PRESSURE: 131 MMHG | HEIGHT: 70 IN | WEIGHT: 193 LBS | TEMPERATURE: 98 F | OXYGEN SATURATION: 99 % | BODY MASS INDEX: 27.63 KG/M2 | DIASTOLIC BLOOD PRESSURE: 83 MMHG | RESPIRATION RATE: 16 BRPM

## 2019-11-22 PROCEDURE — 99024 POSTOP FOLLOW-UP VISIT: CPT

## 2019-11-22 NOTE — PHYSICAL EXAM
[No Rash or Lesion] : No rash or lesion [Purpura] : no purpura  [Petechiae] : no petechiae [Skin Ulcer] : no ulcer [Skin Induration] : no induration [Alert] : alert [Oriented to Person] : oriented to person [Oriented to Place] : oriented to place [Oriented to Time] : oriented to time [Calm] : calm [de-identified] : non toxic, in no acute distress [de-identified] : NC/AT PERRL EOMI no scleral icterus [de-identified] : soft, no localizing tenderness, no guarding, no rebound, no masses [de-identified] : FROM of all extremities, there remains no signs of recurrent ventral and/or umbilical hernias, there remains no evidence of recurrent left inguinal hernia, there is mild swelling in the left spermatic cord without seroma or hematoma  [de-identified] : surgical incisions remain without evidence of infection or inflammation  [de-identified] : mood is calm

## 2019-11-22 NOTE — HISTORY OF PRESENT ILLNESS
[de-identified] : The patient is without complaints.  The patient states he has no pain and/or discomfort.

## 2019-11-22 NOTE — ASSESSMENT
[FreeTextEntry1] : The patient is stable and doing well following a laparoscopic left inguinal hernia repair with mesh. The patient will follow up in 2 weeks time to follow up on the mild swelling. He has been cleared to resume working.

## 2020-01-03 ENCOUNTER — APPOINTMENT (OUTPATIENT)
Dept: SURGERY | Facility: CLINIC | Age: 60
End: 2020-01-03

## 2020-01-24 ENCOUNTER — APPOINTMENT (OUTPATIENT)
Dept: SURGERY | Facility: CLINIC | Age: 60
End: 2020-01-24
Payer: MEDICAID

## 2020-01-24 VITALS
HEART RATE: 64 BPM | SYSTOLIC BLOOD PRESSURE: 127 MMHG | BODY MASS INDEX: 27.2 KG/M2 | OXYGEN SATURATION: 97 % | TEMPERATURE: 97.3 F | RESPIRATION RATE: 16 BRPM | HEIGHT: 70 IN | DIASTOLIC BLOOD PRESSURE: 76 MMHG | WEIGHT: 190 LBS

## 2020-01-24 DIAGNOSIS — K40.90 UNILATERAL INGUINAL HERNIA, W/OUT OBSTRUCTION OR GANGRENE, NOT SPECIFIED AS RECURRENT: ICD-10-CM

## 2020-01-24 PROCEDURE — 99024 POSTOP FOLLOW-UP VISIT: CPT

## 2020-01-24 NOTE — PHYSICAL EXAM
[No Rash or Lesion] : No rash or lesion [Purpura] : no purpura  [Petechiae] : no petechiae [Skin Ulcer] : no ulcer [Skin Induration] : no induration [Alert] : alert [Oriented to Person] : oriented to person [Oriented to Place] : oriented to place [Oriented to Time] : oriented to time [Calm] : calm [de-identified] : non toxic, in no acute distress [de-identified] : NC/AT PERRL EOMI no scleral icterus [de-identified] : soft, no localizing tenderness, no guarding, no rebound, no masses [de-identified] : FROM of all extremities, there remains no signs of recurrent ventral and/or umbilical hernias, there remains no evidence of recurrent left inguinal hernia, there has been interval improvement in the swelling of the left spermatic cord but development of a let inguinal seroma [de-identified] : surgical incisions are healed  [de-identified] : mood is calm

## 2020-01-24 NOTE — HISTORY OF PRESENT ILLNESS
[de-identified] : The patient returns to the office with complaints of mild tightness. He has no pain, no nausea, no vomit.

## 2020-01-24 NOTE — ASSESSMENT
[FreeTextEntry1] : The patient is stable and doing well following a laparoscopic left inguinal hernia repair.  He has a left inguinal seroma that he has been advised will take about 6 to 9 weeks to as long as 6 to 9 months to resolve.  The patient will follow up in 8 weeks to follow up on the seroma.

## 2020-03-27 ENCOUNTER — APPOINTMENT (OUTPATIENT)
Dept: SURGERY | Facility: CLINIC | Age: 60
End: 2020-03-27

## 2020-08-17 ENCOUNTER — APPOINTMENT (OUTPATIENT)
Dept: SURGERY | Facility: CLINIC | Age: 60
End: 2020-08-17
Payer: MEDICAID

## 2020-08-17 VITALS
WEIGHT: 178 LBS | DIASTOLIC BLOOD PRESSURE: 73 MMHG | HEART RATE: 60 BPM | BODY MASS INDEX: 25.48 KG/M2 | SYSTOLIC BLOOD PRESSURE: 129 MMHG | HEIGHT: 70 IN | OXYGEN SATURATION: 100 % | TEMPERATURE: 97.5 F

## 2020-08-17 PROCEDURE — 99213 OFFICE O/P EST LOW 20 MIN: CPT

## 2020-08-17 NOTE — PHYSICAL EXAM
[No Rash or Lesion] : No rash or lesion [Purpura] : no purpura  [Petechiae] : no petechiae [Skin Ulcer] : no ulcer [Skin Induration] : no induration [Alert] : alert [Oriented to Person] : oriented to person [Oriented to Place] : oriented to place [Oriented to Time] : oriented to time [de-identified] : non toxic, in no acute distress [Calm] : calm [de-identified] : soft, no localizing tenderness, no guarding, no rebound, no masses [de-identified] : FROM of all extremities, there remains no signs of recurrent ventral and/or umbilical hernias, there remains no evidence of recurrent left inguinal hernia, there is complete resolution of the swelling of the left spermatic cord the inguinal seroma [de-identified] : NC/AT PERRL EOMI no scleral icterus [de-identified] : surgical incisions are healed  [de-identified] : mood is calm

## 2020-08-17 NOTE — HISTORY OF PRESENT ILLNESS
[de-identified] : The patient returns with a report of 3 days of pain about two months ago that resolved and has no returned.  He reports that the seroma in the left groin has also resolved.  He at this time states that he is feeling well overall.

## 2020-08-17 NOTE — ASSESSMENT
[FreeTextEntry1] : The patient is stable and doing well following a laparoscopic left inguinal hernia repair with mesh.  He has had interval resolution of the left inguinal seroma.  The patient at this time will follow up as needed.

## 2020-08-18 NOTE — ED ADULT NURSE NOTE - PAIN: PRESENCE, MLM
Quality 130: Documentation Of Current Medications In The Medical Record: Current Medications Documented
Detail Level: Detailed
Quality 226: Preventive Care And Screening: Tobacco Use: Screening And Cessation Intervention: Patient screened for tobacco use and is an ex/non-smoker
Quality 47: Advance Care Plan: Advance care planning not documented, reason not otherwise specified.
Quality 431: Preventive Care And Screening: Unhealthy Alcohol Use - Screening: Patient screened for unhealthy alcohol use using a single question and scores less than 2 times per year
denies pain/discomfort

## 2022-05-23 ENCOUNTER — APPOINTMENT (OUTPATIENT)
Dept: SURGERY | Facility: CLINIC | Age: 62
End: 2022-05-23
Payer: MEDICAID

## 2022-05-23 VITALS
WEIGHT: 283 LBS | OXYGEN SATURATION: 97 % | DIASTOLIC BLOOD PRESSURE: 79 MMHG | BODY MASS INDEX: 40.52 KG/M2 | RESPIRATION RATE: 14 BRPM | TEMPERATURE: 97.2 F | HEIGHT: 70 IN | HEART RATE: 78 BPM | SYSTOLIC BLOOD PRESSURE: 149 MMHG

## 2022-05-23 VITALS — WEIGHT: 184 LBS | BODY MASS INDEX: 26.4 KG/M2

## 2022-05-23 PROCEDURE — 99213 OFFICE O/P EST LOW 20 MIN: CPT

## 2022-05-23 NOTE — ASSESSMENT
[FreeTextEntry1] : The patient is a 61 year old male with no clinical evidence of recurrent ventral, umbilical, or inguinal hernias.  The patient at this point will follow up as needed.  A total of 20 minutes was spent coordinating the care of the patient.

## 2022-05-23 NOTE — HISTORY OF PRESENT ILLNESS
[de-identified] : The patient comes to the office today for concerns of a hernia recurrence.  The patient states he contracted a stomach virus last week and is concerned that the abdominal wall hernia may have returned. He states he was vomiting and having explosive bowel movements for about 48 hours until it resolved.

## 2022-05-23 NOTE — PHYSICAL EXAM
[No Rash or Lesion] : No rash or lesion [Purpura] : no purpura  [Petechiae] : no petechiae [Skin Ulcer] : no ulcer [Skin Induration] : no induration [Alert] : alert [Oriented to Person] : oriented to person [Oriented to Place] : oriented to place [Oriented to Time] : oriented to time [Calm] : calm [de-identified] : non toxic, in no acute distress [de-identified] : NC/AT PERRL EOMI no scleral icterus [de-identified] : soft, no localizing tenderness, no guarding, no rebound, no masses [de-identified] : FROM of all extremities, there is no sign of recurrent ventral and/or umbilical hernias, there remains no evidence of recurrent left inguinal hernia, there is complete resolution of the swelling of the left spermatic cord the inguinal seroma [de-identified] : surgical incisions remain healed  [de-identified] : mood is calm

## 2022-10-18 ENCOUNTER — INPATIENT (INPATIENT)
Facility: HOSPITAL | Age: 62
LOS: 2 days | Discharge: HOME | DRG: 863 | End: 2022-10-21
Attending: FAMILY MEDICINE | Admitting: STUDENT IN AN ORGANIZED HEALTH CARE EDUCATION/TRAINING PROGRAM
Payer: MEDICAID

## 2022-10-18 VITALS
SYSTOLIC BLOOD PRESSURE: 181 MMHG | RESPIRATION RATE: 18 BRPM | HEART RATE: 88 BPM | WEIGHT: 184.97 LBS | DIASTOLIC BLOOD PRESSURE: 89 MMHG | TEMPERATURE: 99 F | OXYGEN SATURATION: 96 % | HEIGHT: 70 IN

## 2022-10-18 DIAGNOSIS — L03.90 CELLULITIS, UNSPECIFIED: ICD-10-CM

## 2022-10-18 DIAGNOSIS — Z96.7 PRESENCE OF OTHER BONE AND TENDON IMPLANTS: Chronic | ICD-10-CM

## 2022-10-18 DIAGNOSIS — Z98.890 OTHER SPECIFIED POSTPROCEDURAL STATES: Chronic | ICD-10-CM

## 2022-10-18 LAB
ABO RH CONFIRMATION: SIGNIFICANT CHANGE UP
ALBUMIN SERPL ELPH-MCNC: 4.1 G/DL — SIGNIFICANT CHANGE UP (ref 3.3–5.2)
ALP SERPL-CCNC: 81 U/L — SIGNIFICANT CHANGE UP (ref 40–120)
ALT FLD-CCNC: 17 U/L — SIGNIFICANT CHANGE UP
ANION GAP SERPL CALC-SCNC: 13 MMOL/L — SIGNIFICANT CHANGE UP (ref 5–17)
AST SERPL-CCNC: 19 U/L — SIGNIFICANT CHANGE UP
BASE EXCESS BLDV CALC-SCNC: 0.1 MMOL/L — SIGNIFICANT CHANGE UP (ref -2–3)
BASOPHILS # BLD AUTO: 0.09 K/UL — SIGNIFICANT CHANGE UP (ref 0–0.2)
BASOPHILS NFR BLD AUTO: 0.6 % — SIGNIFICANT CHANGE UP (ref 0–2)
BILIRUB SERPL-MCNC: 0.6 MG/DL — SIGNIFICANT CHANGE UP (ref 0.4–2)
BLD GP AB SCN SERPL QL: SIGNIFICANT CHANGE UP
BUN SERPL-MCNC: 7.7 MG/DL — LOW (ref 8–20)
CA-I SERPL-SCNC: 1.17 MMOL/L — SIGNIFICANT CHANGE UP (ref 1.15–1.33)
CALCIUM SERPL-MCNC: 9.2 MG/DL — SIGNIFICANT CHANGE UP (ref 8.4–10.5)
CHLORIDE BLDV-SCNC: 104 MMOL/L — SIGNIFICANT CHANGE UP (ref 98–107)
CHLORIDE SERPL-SCNC: 105 MMOL/L — SIGNIFICANT CHANGE UP (ref 98–107)
CO2 SERPL-SCNC: 22 MMOL/L — SIGNIFICANT CHANGE UP (ref 22–29)
CREAT SERPL-MCNC: 0.91 MG/DL — SIGNIFICANT CHANGE UP (ref 0.5–1.3)
EGFR: 97 ML/MIN/1.73M2 — SIGNIFICANT CHANGE UP
EOSINOPHIL # BLD AUTO: 0.48 K/UL — SIGNIFICANT CHANGE UP (ref 0–0.5)
EOSINOPHIL NFR BLD AUTO: 3.3 % — SIGNIFICANT CHANGE UP (ref 0–6)
GAS PNL BLDV: 136 MMOL/L — SIGNIFICANT CHANGE UP (ref 136–145)
GAS PNL BLDV: SIGNIFICANT CHANGE UP
GLUCOSE BLDV-MCNC: 97 MG/DL — SIGNIFICANT CHANGE UP (ref 70–99)
GLUCOSE SERPL-MCNC: 99 MG/DL — SIGNIFICANT CHANGE UP (ref 70–99)
HCO3 BLDV-SCNC: 25 MMOL/L — SIGNIFICANT CHANGE UP (ref 22–29)
HCT VFR BLD CALC: 42 % — SIGNIFICANT CHANGE UP (ref 39–50)
HCT VFR BLDA CALC: 42 % — SIGNIFICANT CHANGE UP
HGB BLD CALC-MCNC: 13.9 G/DL — SIGNIFICANT CHANGE UP (ref 12.6–17.4)
HGB BLD-MCNC: 14.3 G/DL — SIGNIFICANT CHANGE UP (ref 13–17)
IMM GRANULOCYTES NFR BLD AUTO: 0.7 % — SIGNIFICANT CHANGE UP (ref 0–0.9)
LACTATE BLDV-MCNC: 1 MMOL/L — SIGNIFICANT CHANGE UP (ref 0.5–2)
LYMPHOCYTES # BLD AUTO: 1.43 K/UL — SIGNIFICANT CHANGE UP (ref 1–3.3)
LYMPHOCYTES # BLD AUTO: 9.9 % — LOW (ref 13–44)
MCHC RBC-ENTMCNC: 28.8 PG — SIGNIFICANT CHANGE UP (ref 27–34)
MCHC RBC-ENTMCNC: 34 GM/DL — SIGNIFICANT CHANGE UP (ref 32–36)
MCV RBC AUTO: 84.7 FL — SIGNIFICANT CHANGE UP (ref 80–100)
MONOCYTES # BLD AUTO: 1.23 K/UL — HIGH (ref 0–0.9)
MONOCYTES NFR BLD AUTO: 8.5 % — SIGNIFICANT CHANGE UP (ref 2–14)
NEUTROPHILS # BLD AUTO: 11.17 K/UL — HIGH (ref 1.8–7.4)
NEUTROPHILS NFR BLD AUTO: 77 % — SIGNIFICANT CHANGE UP (ref 43–77)
PCO2 BLDV: 44 MMHG — SIGNIFICANT CHANGE UP (ref 42–55)
PH BLDV: 7.37 — SIGNIFICANT CHANGE UP (ref 7.32–7.43)
PLATELET # BLD AUTO: 397 K/UL — SIGNIFICANT CHANGE UP (ref 150–400)
PO2 BLDV: 69 MMHG — HIGH (ref 25–45)
POTASSIUM BLDV-SCNC: 3.5 MMOL/L — SIGNIFICANT CHANGE UP (ref 3.5–5.1)
POTASSIUM SERPL-MCNC: 3.7 MMOL/L — SIGNIFICANT CHANGE UP (ref 3.5–5.3)
POTASSIUM SERPL-SCNC: 3.7 MMOL/L — SIGNIFICANT CHANGE UP (ref 3.5–5.3)
PROT SERPL-MCNC: 7.4 G/DL — SIGNIFICANT CHANGE UP (ref 6.6–8.7)
RAPID RVP RESULT: SIGNIFICANT CHANGE UP
RBC # BLD: 4.96 M/UL — SIGNIFICANT CHANGE UP (ref 4.2–5.8)
RBC # FLD: 13 % — SIGNIFICANT CHANGE UP (ref 10.3–14.5)
SAO2 % BLDV: 95.4 % — SIGNIFICANT CHANGE UP
SARS-COV-2 RNA SPEC QL NAA+PROBE: SIGNIFICANT CHANGE UP
SODIUM SERPL-SCNC: 139 MMOL/L — SIGNIFICANT CHANGE UP (ref 135–145)
WBC # BLD: 14.5 K/UL — HIGH (ref 3.8–10.5)
WBC # FLD AUTO: 14.5 K/UL — HIGH (ref 3.8–10.5)

## 2022-10-18 PROCEDURE — 70491 CT SOFT TISSUE NECK W/DYE: CPT | Mod: 26,MA

## 2022-10-18 PROCEDURE — 99285 EMERGENCY DEPT VISIT HI MDM: CPT

## 2022-10-18 PROCEDURE — 71045 X-RAY EXAM CHEST 1 VIEW: CPT | Mod: 26

## 2022-10-18 PROCEDURE — 99223 1ST HOSP IP/OBS HIGH 75: CPT

## 2022-10-18 PROCEDURE — 93010 ELECTROCARDIOGRAM REPORT: CPT

## 2022-10-18 RX ORDER — ONDANSETRON 8 MG/1
4 TABLET, FILM COATED ORAL EVERY 8 HOURS
Refills: 0 | Status: DISCONTINUED | OUTPATIENT
Start: 2022-10-18 | End: 2022-10-21

## 2022-10-18 RX ORDER — PIPERACILLIN AND TAZOBACTAM 4; .5 G/20ML; G/20ML
3.38 INJECTION, POWDER, LYOPHILIZED, FOR SOLUTION INTRAVENOUS ONCE
Refills: 0 | Status: COMPLETED | OUTPATIENT
Start: 2022-10-18 | End: 2022-10-18

## 2022-10-18 RX ORDER — INFLUENZA VIRUS VACCINE 15; 15; 15; 15 UG/.5ML; UG/.5ML; UG/.5ML; UG/.5ML
0.5 SUSPENSION INTRAMUSCULAR ONCE
Refills: 0 | Status: COMPLETED | OUTPATIENT
Start: 2022-10-18 | End: 2022-10-18

## 2022-10-18 RX ORDER — ACETAMINOPHEN 500 MG
650 TABLET ORAL EVERY 6 HOURS
Refills: 0 | Status: DISCONTINUED | OUTPATIENT
Start: 2022-10-18 | End: 2022-10-21

## 2022-10-18 RX ORDER — ROSUVASTATIN CALCIUM 5 MG/1
1 TABLET ORAL
Qty: 0 | Refills: 0 | DISCHARGE

## 2022-10-18 RX ORDER — ATORVASTATIN CALCIUM 80 MG/1
20 TABLET, FILM COATED ORAL AT BEDTIME
Refills: 0 | Status: DISCONTINUED | OUTPATIENT
Start: 2022-10-18 | End: 2022-10-21

## 2022-10-18 RX ORDER — DEXAMETHASONE 0.5 MG/5ML
10 ELIXIR ORAL ONCE
Refills: 0 | Status: COMPLETED | OUTPATIENT
Start: 2022-10-18 | End: 2022-10-18

## 2022-10-18 RX ORDER — LANOLIN ALCOHOL/MO/W.PET/CERES
3 CREAM (GRAM) TOPICAL AT BEDTIME
Refills: 0 | Status: DISCONTINUED | OUTPATIENT
Start: 2022-10-18 | End: 2022-10-21

## 2022-10-18 RX ORDER — PIPERACILLIN AND TAZOBACTAM 4; .5 G/20ML; G/20ML
3.38 INJECTION, POWDER, LYOPHILIZED, FOR SOLUTION INTRAVENOUS EVERY 8 HOURS
Refills: 0 | Status: DISCONTINUED | OUTPATIENT
Start: 2022-10-19 | End: 2022-10-21

## 2022-10-18 RX ORDER — VANCOMYCIN HCL 1 G
1000 VIAL (EA) INTRAVENOUS ONCE
Refills: 0 | Status: COMPLETED | OUTPATIENT
Start: 2022-10-18 | End: 2022-10-18

## 2022-10-18 RX ORDER — BNT162B2 ORIGINAL AND OMICRON BA.4/BA.5 .1125; .1125 MG/2.25ML; MG/2.25ML
0.3 INJECTION, SUSPENSION INTRAMUSCULAR ONCE
Refills: 0 | Status: COMPLETED | OUTPATIENT
Start: 2022-10-18 | End: 2022-10-18

## 2022-10-18 RX ORDER — HEPARIN SODIUM 5000 [USP'U]/ML
5000 INJECTION INTRAVENOUS; SUBCUTANEOUS EVERY 12 HOURS
Refills: 0 | Status: DISCONTINUED | OUTPATIENT
Start: 2022-10-18 | End: 2022-10-21

## 2022-10-18 RX ORDER — MORPHINE SULFATE 50 MG/1
4 CAPSULE, EXTENDED RELEASE ORAL ONCE
Refills: 0 | Status: DISCONTINUED | OUTPATIENT
Start: 2022-10-18 | End: 2022-10-18

## 2022-10-18 RX ORDER — LOSARTAN POTASSIUM 100 MG/1
1 TABLET, FILM COATED ORAL
Qty: 0 | Refills: 0 | DISCHARGE

## 2022-10-18 RX ORDER — LOSARTAN POTASSIUM 100 MG/1
100 TABLET, FILM COATED ORAL DAILY
Refills: 0 | Status: DISCONTINUED | OUTPATIENT
Start: 2022-10-18 | End: 2022-10-21

## 2022-10-18 RX ORDER — BUDESONIDE AND FORMOTEROL FUMARATE DIHYDRATE 160; 4.5 UG/1; UG/1
2 AEROSOL RESPIRATORY (INHALATION)
Qty: 0 | Refills: 0 | DISCHARGE

## 2022-10-18 RX ORDER — MORPHINE SULFATE 50 MG/1
2 CAPSULE, EXTENDED RELEASE ORAL ONCE
Refills: 0 | Status: DISCONTINUED | OUTPATIENT
Start: 2022-10-18 | End: 2022-10-18

## 2022-10-18 RX ADMIN — Medication 10 MILLIGRAM(S): at 09:31

## 2022-10-18 RX ADMIN — MORPHINE SULFATE 2 MILLIGRAM(S): 50 CAPSULE, EXTENDED RELEASE ORAL at 21:00

## 2022-10-18 RX ADMIN — Medication 1000 MILLIGRAM(S): at 16:07

## 2022-10-18 RX ADMIN — MORPHINE SULFATE 2 MILLIGRAM(S): 50 CAPSULE, EXTENDED RELEASE ORAL at 20:41

## 2022-10-18 RX ADMIN — Medication 250 MILLIGRAM(S): at 13:48

## 2022-10-18 RX ADMIN — PIPERACILLIN AND TAZOBACTAM 3.38 GRAM(S): 4; .5 INJECTION, POWDER, LYOPHILIZED, FOR SOLUTION INTRAVENOUS at 13:33

## 2022-10-18 RX ADMIN — ATORVASTATIN CALCIUM 20 MILLIGRAM(S): 80 TABLET, FILM COATED ORAL at 20:42

## 2022-10-18 RX ADMIN — PIPERACILLIN AND TAZOBACTAM 25 GRAM(S): 4; .5 INJECTION, POWDER, LYOPHILIZED, FOR SOLUTION INTRAVENOUS at 20:41

## 2022-10-18 RX ADMIN — Medication 650 MILLIGRAM(S): at 17:05

## 2022-10-18 RX ADMIN — MORPHINE SULFATE 4 MILLIGRAM(S): 50 CAPSULE, EXTENDED RELEASE ORAL at 13:33

## 2022-10-18 RX ADMIN — HEPARIN SODIUM 5000 UNIT(S): 5000 INJECTION INTRAVENOUS; SUBCUTANEOUS at 17:05

## 2022-10-18 RX ADMIN — PIPERACILLIN AND TAZOBACTAM 200 GRAM(S): 4; .5 INJECTION, POWDER, LYOPHILIZED, FOR SOLUTION INTRAVENOUS at 09:31

## 2022-10-18 RX ADMIN — MORPHINE SULFATE 4 MILLIGRAM(S): 50 CAPSULE, EXTENDED RELEASE ORAL at 13:48

## 2022-10-18 RX ADMIN — MORPHINE SULFATE 4 MILLIGRAM(S): 50 CAPSULE, EXTENDED RELEASE ORAL at 10:29

## 2022-10-18 RX ADMIN — LOSARTAN POTASSIUM 100 MILLIGRAM(S): 100 TABLET, FILM COATED ORAL at 17:05

## 2022-10-18 NOTE — PATIENT PROFILE ADULT - FALL HARM RISK - UNIVERSAL INTERVENTIONS
Bed in lowest position, wheels locked, appropriate side rails in place/Call bell, personal items and telephone in reach/Instruct patient to call for assistance before getting out of bed or chair/Non-slip footwear when patient is out of bed/[place] to call system/Physically safe environment - no spills, clutter or unnecessary equipment/Purposeful Proactive Rounding/Room/bathroom lighting operational, light cord in reach

## 2022-10-18 NOTE — ED PROVIDER NOTE - ATTENDING CONTRIBUTION TO CARE
The patient seen and examined    Wound Infection    I, Warner Galeano, performed the initial face to face bedside interview with this patient regarding history of present illness, review of symptoms and relevant past medical, social and family history.  I completed an independent physical examination.  I was the initial provider who evaluated this patient. I have signed out the follow up of any pending tests (i.e. labs, radiological studies) to the resident.  I have communicated the patient’s plan of care and disposition with the resident The patient seen and examined    Neck Cellulitis    I, Warner Galeano, performed the initial face to face bedside interview with this patient regarding history of present illness, review of symptoms and relevant past medical, social and family history.  I completed an independent physical examination.  I was the initial provider who evaluated this patient. I have signed out the follow up of any pending tests (i.e. labs, radiological studies) to the resident.  I have communicated the patient’s plan of care and disposition with the resident

## 2022-10-18 NOTE — ED PROVIDER NOTE - OBJECTIVE STATEMENT
The patient is a 61 year old male presents with neck pain and swelling s/p tooth extraction 6 days ago by dentist  The patient states that he is able to drink and eat  No Trismus, No Torticollis, No CP, No SOB, No hoarsenss

## 2022-10-18 NOTE — ED PROVIDER NOTE - MUSCULOSKELETAL NEGATIVE STATEMENT, MLM
Pt's blood sugar 105. Held sliding scale humalog. no back pain, no gout, no musculoskeletal pain, no neck pain, and no weakness.

## 2022-10-18 NOTE — H&P ADULT - NSHPLABSRESULTS_GEN_ALL_CORE
14.3   14.50  )----------(  397       ( 18 Oct 2022 09:10 )               42.0      139    |  105    |  7.7    ----------------------------<  99         ( 18 Oct 2022 09:10 )  3.7     |  22.0   |  0.91     Ca    9.2        ( 18 Oct 2022 09:10 )    TPro  7.4    /  Alb  4.1    /  TBili  0.6    /  DBili  x      /  AST  19     /  ALT  17     /  AlkPhos  81     ( 18 Oct 2022 09:10 )    LIVER FUNCTIONS - ( 18 Oct 2022 09:10 )  Alb: 4.1 g/dL / Pro: 7.4 g/dL / ALK PHOS: 81 U/L / ALT: 17 U/L / AST: 19 U/L / GGT: x               CAPILLARY BLOOD GLUCOSE

## 2022-10-18 NOTE — H&P ADULT - ASSESSMENT
61M with PMH of HTN, Asthma presenting to the ED with facial/oral swelling that is progressively worsening admitted with cellulitis of Neck.      Cellulitis of neck with soft tissue edema  Leukocytisis  CT neck without focal collection or abscess  Received Vanc Zosyn in ED  Blood cultures ordered ( after abx)  ID consulted  Cont Zosyn for now and follow up culture data and exam for improvement    HTN  resume home meds    Asthma   Cont home meds    DVT ppx: ALEXANDR  Dispo: Med tele

## 2022-10-18 NOTE — ED PROVIDER NOTE - CLINICAL SUMMARY MEDICAL DECISION MAKING FREE TEXT BOX
61 year old male presents with neck cellulitis s/p tooth extraction 6 days ago, Patient given IV abx.  Labs and imaging performed to evaluate the patient. Admit

## 2022-10-18 NOTE — ED ADULT TRIAGE NOTE - CHIEF COMPLAINT QUOTE
lower jaw & facial swelling s/p having right lower tooth extracted Wednesday . denies sob/drainage, speaking full sentences.

## 2022-10-18 NOTE — ED PROVIDER NOTE - ENMT, MLM
Airway patent, Nasal mucosa clear. Mouth with normal mucosa. Throat has no vesicles, no oropharyngeal exudates and uvula is midline. L submandibular swelling. No trismus, No Torticollis

## 2022-10-18 NOTE — ED ADULT NURSE NOTE - OBJECTIVE STATEMENT
PT presents to ED for left sided facial swelling that started after having a tooth extracted Wednesday. PT denies fevers at home. Denies difficulty maintaining secretions. No resp distress at this time. IV in place labs pending results. Medicated per orders. PT requesting pain medication, MD Galeano contacted and made aware. Pending medication orders

## 2022-10-18 NOTE — ED ADULT NURSE NOTE - NSICDXPASTSURGICALHX_GEN_ALL_CORE_FT
PAST SURGICAL HISTORY:  S/P hernia surgery     S/P ORIF (open reduction internal fixation) fracture L shoulder    S/P shoulder surgery R

## 2022-10-18 NOTE — H&P ADULT - HISTORY OF PRESENT ILLNESS
61M with PMH of HTN, Asthma presenting to the ED with facial/oral swelling progressively worsening.    Patient states had tooth extracted last Wednesday since that time noticed that he was having persistent jaw pain at that site. Started developing neck, soft tissue swelling on Sunday. Woke up this morning with worsening neck swelling and pressure under his tongue.   Subjective fevers, no chills, nausea, vomitting, diarrhea, abdominal pain, cp, sob.

## 2022-10-18 NOTE — CONSULT NOTE ADULT - ASSESSMENT
60 y/o healthy man presents with progressive left sided facial/neck swelling and pain after molar extraction on 10/12. Admits to tongue swelling (touching the palate), mild difficulty swallowing, and sweats. Denies difficulty breathing, drooling, fever, chills, cough, CP. He was not receiving antibiotics as outpatient.     In the ED found to have WBC 14.5. CT Neck with soft tissue swelling involving the left anterior premandibular soft tissues extending into the submental space. Moderate edema and soft tissue swelling left submandibular space. Left submandibular gland is mildly enlarged. Diffuse anterior neck subcutaneous edema extending inferiorly to the level of the thyroid gland. No drainable loculated fluid collection.     Received one dose of piperacillin-tazobactam + vanco in the ED     Impression:  Neck and face soft tissue infection s/p tooth extraction     Plan:  - Start piperacillin-tazobactam 3.375 mg IV q8h by EI  - Check BCx  - Recommend ENT eval   - No evidence of impending airway compromise but monitor closely   - D/w radiology - no evidence of thrombus.     D/w Dr. Hobbs and patient    Will continue to follow     Sweetie Ramirez MD

## 2022-10-18 NOTE — H&P ADULT - NSHPPHYSICALEXAM_GEN_ALL_CORE
VITALS:   T(C): 36.9 (10-18-22 @ 15:37), Max: 37 (10-18-22 @ 08:21)  HR: 78 (10-18-22 @ 15:37) (67 - 88)  BP: 158/90 (10-18-22 @ 15:37) (158/90 - 205/81)  RR: 18 (10-18-22 @ 15:37) (16 - 18)  SpO2: 96% (10-18-22 @ 15:37) (92% - 96%)    GENERAL: NAD, lying in bed comfortably  HEAD:  Atraumatic, Normocephalic  EYES: EOMI, PERRLA, conjunctiva and sclera clear  ENT: Moist mucous membranes  NECK: Supple, No JVD  CHEST/LUNG: Clear to auscultation bilaterally; No rales, rhonchi, wheezing, or rubs. Unlabored respirations  HEART: Regular rate and rhythm; No murmurs, rubs, or gallops  ABDOMEN: BSx4; Soft, nontender, nondistended  EXTREMITIES:  2+ Peripheral Pulses, brisk capillary refill. No clubbing, cyanosis, or edema  NERVOUS SYSTEM:  A&Ox3, no focal deficits   SKIN: No rashes or lesions  PSYCH: Normal affect, euthymic mood

## 2022-10-18 NOTE — ED ADULT NURSE REASSESSMENT NOTE - NS ED NURSE REASSESS COMMENT FT1
pt received from previous RN.  Patient A&Ox4 awaiting CT. Pt with swelling noted to left side of face and neck. Airway patent.  Denies difficulty breathing. Cardiac monitoring and pulse oximetry maintained, NSR on monitor, O2 sat 95% on RA. NAD noted, respirations even and unlabored.  Safety precautions in place.  Plan of care explained, pt verbalized understanding. Pt brought to CT for priority CT.  MD PAGAN Aware of BP.

## 2022-10-18 NOTE — ED PROVIDER NOTE - CPE EDP MUSC NORM
----- Message from Tatyana Jean sent at 3/20/2019  1:19 PM CDT -----  Contact: Patient  Type:  Sooner Apoointment Request    Caller is requesting a sooner appointment.  Caller declined first available appointment listed below.  Caller will not accept being placed on the waitlist and is requesting a message be sent to doctor.    Name of Caller:  Qiana, patient  When is the first available appointment?  04/02/2019  Symptoms:  Questions regarding gastric bypass, Rx refills  Best Call Back Number:  [phone]  Additional Information:  Please call her. Thanks.    
Spoke with patient notified I have the form but wants to speak with Dr Odonnell about the surgery procedure.  Appointment set up  
normal...

## 2022-10-18 NOTE — CONSULT NOTE ADULT - SUBJECTIVE AND OBJECTIVE BOX
[facility]  INFECTIOUS DISEASES at [place] and [place]  =====================================================                               Joey Disla MD*    Nafisa Lamas MD*                             Mckenzie Leyva MD*     Sweetie Ramirez MD*            Diplomates American Board of Internal Medicine & Infectious Diseases                * [place] Office - Appt - Tel  [phone] Fax [phone]                * [place] Office - Appt - Tel [phone] Fax [phone]                                  Hospital Consult line:  [phone]  =====================================================      N-[id number]  JOSE DOMINGUEZ        CC: Patient is a 61y old  Male who presents with a chief complaint of       HPI: 60 y/o healthy man presents with progressive left sided facial/neck swelling and pain after molar extraction on 10/12. Admits to tongue swelling (touching the palate), mild difficulty swallowing, and sweats. Denies difficulty breathing, drooling, fever, chills, cough, CP. He was not receiving antibiotics as outpatient.     In the ED found to have WBC 14.5. CT Neck with soft tissue swelling involving the left anterior premandibular soft tissues extending into the submental space. Moderate edema and soft tissue swelling left submandibular space. Left submandibular gland is mildly enlarged. Diffuse anterior neck subcutaneous edema extending inferiorly to the level of the thyroid gland. No drainable loculated fluid collection.     Received piperacillin-tazobactam and IV vancomycin. No BCx obtained.   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  GENITOURINARY:  No dysuria, frequency or urgency. No blood in urine  MUSCULOSKELETAL:  no joint aches, no muscle pain  SKIN:  as above   NEUROLOGIC:  No headaches, seizures  PSYCHIATRIC:  No disorder of thought or mood.  ENDOCRINE:  No heat or cold intolerance  HEMATOLOGICAL:  No easy bruising or bleeding.     _____________________________________________________  PHYSICAL EXAM:  GEN: awake, alert, oriented x 3. Lying  in bed, in no acute distress   HEENT: PERRL. Marked left sided facial swelling. Submental space edematous. No drooling.   NECK: diffuse left sided edema   LUNGS: Speaking full sentences. Eupneic, CTA B/L, no adventitious sounds  HEART: RRR, no m/r/g  ABDOMEN: Soft, NT, ND, no HSM.  +BS.    :no Pedroza catheter  EXTREMITIES: well perfused, without  edema.  MSK: No joint deformity or swelling  LYMPH: no palpable cervical, supraclavicular, axillary or inguinal lymph nodes  NEUROLOGIC: Grossly no focal deficits   PSYCHIATRIC: Appropriate affect and mood.  SKIN: No rash, wounds or jaundice  LINES: no central lines, only peripheral access c/d/i    ______________________________________________________  Height (cm): 177.8 (10-18 @ 08:21)  Weight (kg): 83.9 (10-18 @ 08:21)  BMI (kg/m2): 26.5 (10-18 @ 08:21)  BSA (m2): 2.02 (10-18 @ 08:21)    Vitals:    T(F): 98.6 (18 Oct 2022 08:21), Max: 98.6 (18 Oct 2022 08:21)  HR: 82 (18 Oct 2022 13:30)  BP: 205/81 (18 Oct 2022 13:30)  RR: 16 (18 Oct 2022 13:30)  SpO2: 92% (18 Oct 2022 13:30) (92% - 96%)  temp max in last 48H T(F): , Max: 98.6 (10-18-22 @ 08:21)    Current Antibiotics:  piperacillin-tazobactam 3.375 IV x 1 dose  vancomycin 1 g IV x 1 dose     Other medications:                            14.3   14.50 )-----------( 397      ( 18 Oct 2022 09:10 )             42.0     10-18    139  |  105  |  7.7<L>  ----------------------------<  99  3.7   |  22.0  |  0.91    Ca    9.2      18 Oct 2022 09:10    TPro  7.4  /  Alb  4.1  /  TBili  0.6  /  DBili  x   /  AST  19  /  ALT  17  /  AlkPhos  81  10-18    RECENT CULTURES:  10-18 @ 09:30    RVP with SARS-CoV-2   NotDetec      WBC Count: 14.50 K/uL (10-18-22 @ 09:10)    Creatinine, Serum: 0.91 mg/dL (10-18-22 @ 09:10)      SARS-CoV-2: NotDetec (10-18-22 @ 09:30)    ______________________________________________________  RADIOLOGY  < from: CT Neck Soft Tissue w/ IV Cont (10.18.22 @ 12:12) >    IMPRESSION:  Status post left mandibular first premolar extraction.   Moderate edema soft tissue swelling involving the left anterior   premandibular soft tissues extending into the submental space. Moderate   edema and soft tissue swelling left submandibular space. Left   submandibular gland is mildly enlarged. Diffuse anterior neck   subcutaneous edema extending inferiorly to the level of the thyroid   gland. No drainable loculated fluid collection. Findings likely   represents diffuse cellulitis. No prevertebral effusion. Consider   follow-up CT imaging in 5-7 days with contrast as clinically warranted.    < end of copied text >

## 2022-10-19 LAB
ANION GAP SERPL CALC-SCNC: 14 MMOL/L — SIGNIFICANT CHANGE UP (ref 5–17)
BASOPHILS # BLD AUTO: 0.05 K/UL — SIGNIFICANT CHANGE UP (ref 0–0.2)
BASOPHILS NFR BLD AUTO: 0.3 % — SIGNIFICANT CHANGE UP (ref 0–2)
BUN SERPL-MCNC: 12.5 MG/DL — SIGNIFICANT CHANGE UP (ref 8–20)
CALCIUM SERPL-MCNC: 9.8 MG/DL — SIGNIFICANT CHANGE UP (ref 8.4–10.5)
CHLORIDE SERPL-SCNC: 101 MMOL/L — SIGNIFICANT CHANGE UP (ref 98–107)
CO2 SERPL-SCNC: 25 MMOL/L — SIGNIFICANT CHANGE UP (ref 22–29)
CREAT SERPL-MCNC: 1 MG/DL — SIGNIFICANT CHANGE UP (ref 0.5–1.3)
EGFR: 86 ML/MIN/1.73M2 — SIGNIFICANT CHANGE UP
EOSINOPHIL # BLD AUTO: 0.05 K/UL — SIGNIFICANT CHANGE UP (ref 0–0.5)
EOSINOPHIL NFR BLD AUTO: 0.3 % — SIGNIFICANT CHANGE UP (ref 0–6)
GLUCOSE SERPL-MCNC: 91 MG/DL — SIGNIFICANT CHANGE UP (ref 70–99)
HCT VFR BLD CALC: 42.6 % — SIGNIFICANT CHANGE UP (ref 39–50)
HCV AB S/CO SERPL IA: 0.19 S/CO — SIGNIFICANT CHANGE UP (ref 0–0.99)
HCV AB SERPL-IMP: SIGNIFICANT CHANGE UP
HGB BLD-MCNC: 14.3 G/DL — SIGNIFICANT CHANGE UP (ref 13–17)
IMM GRANULOCYTES NFR BLD AUTO: 0.6 % — SIGNIFICANT CHANGE UP (ref 0–0.9)
LYMPHOCYTES # BLD AUTO: 13.6 % — SIGNIFICANT CHANGE UP (ref 13–44)
LYMPHOCYTES # BLD AUTO: 2.61 K/UL — SIGNIFICANT CHANGE UP (ref 1–3.3)
MAGNESIUM SERPL-MCNC: 2.2 MG/DL — SIGNIFICANT CHANGE UP (ref 1.6–2.6)
MCHC RBC-ENTMCNC: 28.8 PG — SIGNIFICANT CHANGE UP (ref 27–34)
MCHC RBC-ENTMCNC: 33.6 GM/DL — SIGNIFICANT CHANGE UP (ref 32–36)
MCV RBC AUTO: 85.9 FL — SIGNIFICANT CHANGE UP (ref 80–100)
MONOCYTES # BLD AUTO: 1.48 K/UL — HIGH (ref 0–0.9)
MONOCYTES NFR BLD AUTO: 7.7 % — SIGNIFICANT CHANGE UP (ref 2–14)
NEUTROPHILS # BLD AUTO: 14.91 K/UL — HIGH (ref 1.8–7.4)
NEUTROPHILS NFR BLD AUTO: 77.5 % — HIGH (ref 43–77)
PHOSPHATE SERPL-MCNC: 2.4 MG/DL — SIGNIFICANT CHANGE UP (ref 2.4–4.7)
PLATELET # BLD AUTO: 478 K/UL — HIGH (ref 150–400)
POTASSIUM SERPL-MCNC: 3.9 MMOL/L — SIGNIFICANT CHANGE UP (ref 3.5–5.3)
POTASSIUM SERPL-SCNC: 3.9 MMOL/L — SIGNIFICANT CHANGE UP (ref 3.5–5.3)
RBC # BLD: 4.96 M/UL — SIGNIFICANT CHANGE UP (ref 4.2–5.8)
RBC # FLD: 13 % — SIGNIFICANT CHANGE UP (ref 10.3–14.5)
SODIUM SERPL-SCNC: 140 MMOL/L — SIGNIFICANT CHANGE UP (ref 135–145)
WBC # BLD: 19.22 K/UL — HIGH (ref 3.8–10.5)
WBC # FLD AUTO: 19.22 K/UL — HIGH (ref 3.8–10.5)

## 2022-10-19 PROCEDURE — 99232 SBSQ HOSP IP/OBS MODERATE 35: CPT

## 2022-10-19 PROCEDURE — 99233 SBSQ HOSP IP/OBS HIGH 50: CPT

## 2022-10-19 RX ORDER — HYDROMORPHONE HYDROCHLORIDE 2 MG/ML
1 INJECTION INTRAMUSCULAR; INTRAVENOUS; SUBCUTANEOUS EVERY 6 HOURS
Refills: 0 | Status: DISCONTINUED | OUTPATIENT
Start: 2022-10-19 | End: 2022-10-21

## 2022-10-19 RX ORDER — MORPHINE SULFATE 50 MG/1
2 CAPSULE, EXTENDED RELEASE ORAL ONCE
Refills: 0 | Status: DISCONTINUED | OUTPATIENT
Start: 2022-10-19 | End: 2022-10-19

## 2022-10-19 RX ORDER — IPRATROPIUM/ALBUTEROL SULFATE 18-103MCG
3 AEROSOL WITH ADAPTER (GRAM) INHALATION EVERY 6 HOURS
Refills: 0 | Status: DISCONTINUED | OUTPATIENT
Start: 2022-10-19 | End: 2022-10-21

## 2022-10-19 RX ORDER — HYDRALAZINE HCL 50 MG
5 TABLET ORAL ONCE
Refills: 0 | Status: COMPLETED | OUTPATIENT
Start: 2022-10-19 | End: 2022-10-19

## 2022-10-19 RX ORDER — ALBUTEROL 90 UG/1
2 AEROSOL, METERED ORAL EVERY 6 HOURS
Refills: 0 | Status: DISCONTINUED | OUTPATIENT
Start: 2022-10-19 | End: 2022-10-21

## 2022-10-19 RX ADMIN — PIPERACILLIN AND TAZOBACTAM 25 GRAM(S): 4; .5 INJECTION, POWDER, LYOPHILIZED, FOR SOLUTION INTRAVENOUS at 05:02

## 2022-10-19 RX ADMIN — Medication 5 MILLIGRAM(S): at 04:20

## 2022-10-19 RX ADMIN — HEPARIN SODIUM 5000 UNIT(S): 5000 INJECTION INTRAVENOUS; SUBCUTANEOUS at 05:02

## 2022-10-19 RX ADMIN — PIPERACILLIN AND TAZOBACTAM 25 GRAM(S): 4; .5 INJECTION, POWDER, LYOPHILIZED, FOR SOLUTION INTRAVENOUS at 14:28

## 2022-10-19 RX ADMIN — LOSARTAN POTASSIUM 100 MILLIGRAM(S): 100 TABLET, FILM COATED ORAL at 05:02

## 2022-10-19 RX ADMIN — HYDROMORPHONE HYDROCHLORIDE 1 MILLIGRAM(S): 2 INJECTION INTRAMUSCULAR; INTRAVENOUS; SUBCUTANEOUS at 10:38

## 2022-10-19 RX ADMIN — HYDROMORPHONE HYDROCHLORIDE 1 MILLIGRAM(S): 2 INJECTION INTRAMUSCULAR; INTRAVENOUS; SUBCUTANEOUS at 10:22

## 2022-10-19 RX ADMIN — PIPERACILLIN AND TAZOBACTAM 25 GRAM(S): 4; .5 INJECTION, POWDER, LYOPHILIZED, FOR SOLUTION INTRAVENOUS at 22:21

## 2022-10-19 RX ADMIN — ATORVASTATIN CALCIUM 20 MILLIGRAM(S): 80 TABLET, FILM COATED ORAL at 22:21

## 2022-10-19 RX ADMIN — MORPHINE SULFATE 2 MILLIGRAM(S): 50 CAPSULE, EXTENDED RELEASE ORAL at 04:20

## 2022-10-19 NOTE — CHART NOTE - NSCHARTNOTEFT_GEN_A_CORE
Pt with /82  All remainder of vital signs stable  Endorsing pain to neck and mouth   Denies chest pain, SOB, palpitations, HA, n/v/d/c, abdominal pain, dizziness, blurry vision  5mg hydralazine IV   2mg IV morphine IV   RN to notify of any acute change in pt status

## 2022-10-20 LAB
ANION GAP SERPL CALC-SCNC: 12 MMOL/L — SIGNIFICANT CHANGE UP (ref 5–17)
BUN SERPL-MCNC: 14.4 MG/DL — SIGNIFICANT CHANGE UP (ref 8–20)
CALCIUM SERPL-MCNC: 9.2 MG/DL — SIGNIFICANT CHANGE UP (ref 8.4–10.5)
CHLORIDE SERPL-SCNC: 104 MMOL/L — SIGNIFICANT CHANGE UP (ref 98–107)
CO2 SERPL-SCNC: 24 MMOL/L — SIGNIFICANT CHANGE UP (ref 22–29)
CREAT SERPL-MCNC: 1.03 MG/DL — SIGNIFICANT CHANGE UP (ref 0.5–1.3)
EGFR: 83 ML/MIN/1.73M2 — SIGNIFICANT CHANGE UP
GLUCOSE SERPL-MCNC: 89 MG/DL — SIGNIFICANT CHANGE UP (ref 70–99)
HCT VFR BLD CALC: 40.5 % — SIGNIFICANT CHANGE UP (ref 39–50)
HGB BLD-MCNC: 13.5 G/DL — SIGNIFICANT CHANGE UP (ref 13–17)
MCHC RBC-ENTMCNC: 29 PG — SIGNIFICANT CHANGE UP (ref 27–34)
MCHC RBC-ENTMCNC: 33.3 GM/DL — SIGNIFICANT CHANGE UP (ref 32–36)
MCV RBC AUTO: 86.9 FL — SIGNIFICANT CHANGE UP (ref 80–100)
PLATELET # BLD AUTO: 428 K/UL — HIGH (ref 150–400)
POTASSIUM SERPL-MCNC: 4.1 MMOL/L — SIGNIFICANT CHANGE UP (ref 3.5–5.3)
POTASSIUM SERPL-SCNC: 4.1 MMOL/L — SIGNIFICANT CHANGE UP (ref 3.5–5.3)
RBC # BLD: 4.66 M/UL — SIGNIFICANT CHANGE UP (ref 4.2–5.8)
RBC # FLD: 13.2 % — SIGNIFICANT CHANGE UP (ref 10.3–14.5)
SODIUM SERPL-SCNC: 140 MMOL/L — SIGNIFICANT CHANGE UP (ref 135–145)
WBC # BLD: 11.48 K/UL — HIGH (ref 3.8–10.5)
WBC # FLD AUTO: 11.48 K/UL — HIGH (ref 3.8–10.5)

## 2022-10-20 PROCEDURE — 99232 SBSQ HOSP IP/OBS MODERATE 35: CPT

## 2022-10-20 RX ADMIN — PIPERACILLIN AND TAZOBACTAM 25 GRAM(S): 4; .5 INJECTION, POWDER, LYOPHILIZED, FOR SOLUTION INTRAVENOUS at 06:41

## 2022-10-20 RX ADMIN — ATORVASTATIN CALCIUM 20 MILLIGRAM(S): 80 TABLET, FILM COATED ORAL at 21:45

## 2022-10-20 RX ADMIN — PIPERACILLIN AND TAZOBACTAM 25 GRAM(S): 4; .5 INJECTION, POWDER, LYOPHILIZED, FOR SOLUTION INTRAVENOUS at 21:45

## 2022-10-20 RX ADMIN — LOSARTAN POTASSIUM 100 MILLIGRAM(S): 100 TABLET, FILM COATED ORAL at 06:41

## 2022-10-20 RX ADMIN — HEPARIN SODIUM 5000 UNIT(S): 5000 INJECTION INTRAVENOUS; SUBCUTANEOUS at 06:41

## 2022-10-20 RX ADMIN — PIPERACILLIN AND TAZOBACTAM 25 GRAM(S): 4; .5 INJECTION, POWDER, LYOPHILIZED, FOR SOLUTION INTRAVENOUS at 13:53

## 2022-10-21 ENCOUNTER — TRANSCRIPTION ENCOUNTER (OUTPATIENT)
Age: 62
End: 2022-10-21

## 2022-10-21 VITALS
HEART RATE: 64 BPM | SYSTOLIC BLOOD PRESSURE: 146 MMHG | RESPIRATION RATE: 18 BRPM | TEMPERATURE: 98 F | OXYGEN SATURATION: 98 % | DIASTOLIC BLOOD PRESSURE: 74 MMHG

## 2022-10-21 LAB
HCT VFR BLD CALC: 42.1 % — SIGNIFICANT CHANGE UP (ref 39–50)
HGB BLD-MCNC: 13.6 G/DL — SIGNIFICANT CHANGE UP (ref 13–17)
MCHC RBC-ENTMCNC: 28.3 PG — SIGNIFICANT CHANGE UP (ref 27–34)
MCHC RBC-ENTMCNC: 32.3 GM/DL — SIGNIFICANT CHANGE UP (ref 32–36)
MCV RBC AUTO: 87.5 FL — SIGNIFICANT CHANGE UP (ref 80–100)
PLATELET # BLD AUTO: 461 K/UL — HIGH (ref 150–400)
RBC # BLD: 4.81 M/UL — SIGNIFICANT CHANGE UP (ref 4.2–5.8)
RBC # FLD: 13.2 % — SIGNIFICANT CHANGE UP (ref 10.3–14.5)
WBC # BLD: 9.41 K/UL — SIGNIFICANT CHANGE UP (ref 3.8–10.5)
WBC # FLD AUTO: 9.41 K/UL — SIGNIFICANT CHANGE UP (ref 3.8–10.5)

## 2022-10-21 PROCEDURE — 84100 ASSAY OF PHOSPHORUS: CPT

## 2022-10-21 PROCEDURE — 99239 HOSP IP/OBS DSCHRG MGMT >30: CPT

## 2022-10-21 PROCEDURE — 86900 BLOOD TYPING SEROLOGIC ABO: CPT

## 2022-10-21 PROCEDURE — 86901 BLOOD TYPING SEROLOGIC RH(D): CPT

## 2022-10-21 PROCEDURE — 85025 COMPLETE CBC W/AUTO DIFF WBC: CPT

## 2022-10-21 PROCEDURE — 87040 BLOOD CULTURE FOR BACTERIA: CPT

## 2022-10-21 PROCEDURE — 96375 TX/PRO/DX INJ NEW DRUG ADDON: CPT

## 2022-10-21 PROCEDURE — 85027 COMPLETE CBC AUTOMATED: CPT

## 2022-10-21 PROCEDURE — 82947 ASSAY GLUCOSE BLOOD QUANT: CPT

## 2022-10-21 PROCEDURE — 84295 ASSAY OF SERUM SODIUM: CPT

## 2022-10-21 PROCEDURE — 82330 ASSAY OF CALCIUM: CPT

## 2022-10-21 PROCEDURE — 96374 THER/PROPH/DIAG INJ IV PUSH: CPT

## 2022-10-21 PROCEDURE — 80048 BASIC METABOLIC PNL TOTAL CA: CPT

## 2022-10-21 PROCEDURE — 0225U NFCT DS DNA&RNA 21 SARSCOV2: CPT

## 2022-10-21 PROCEDURE — 85018 HEMOGLOBIN: CPT

## 2022-10-21 PROCEDURE — 80053 COMPREHEN METABOLIC PANEL: CPT

## 2022-10-21 PROCEDURE — 86850 RBC ANTIBODY SCREEN: CPT

## 2022-10-21 PROCEDURE — 36415 COLL VENOUS BLD VENIPUNCTURE: CPT

## 2022-10-21 PROCEDURE — 84132 ASSAY OF SERUM POTASSIUM: CPT

## 2022-10-21 PROCEDURE — 85014 HEMATOCRIT: CPT

## 2022-10-21 PROCEDURE — 83735 ASSAY OF MAGNESIUM: CPT

## 2022-10-21 PROCEDURE — 82803 BLOOD GASES ANY COMBINATION: CPT

## 2022-10-21 PROCEDURE — 86803 HEPATITIS C AB TEST: CPT

## 2022-10-21 PROCEDURE — 71045 X-RAY EXAM CHEST 1 VIEW: CPT

## 2022-10-21 PROCEDURE — 99232 SBSQ HOSP IP/OBS MODERATE 35: CPT

## 2022-10-21 PROCEDURE — 82435 ASSAY OF BLOOD CHLORIDE: CPT

## 2022-10-21 PROCEDURE — 83605 ASSAY OF LACTIC ACID: CPT

## 2022-10-21 PROCEDURE — 99285 EMERGENCY DEPT VISIT HI MDM: CPT

## 2022-10-21 PROCEDURE — 96376 TX/PRO/DX INJ SAME DRUG ADON: CPT

## 2022-10-21 PROCEDURE — 93005 ELECTROCARDIOGRAM TRACING: CPT

## 2022-10-21 PROCEDURE — 70491 CT SOFT TISSUE NECK W/DYE: CPT | Mod: MA

## 2022-10-21 RX ADMIN — HEPARIN SODIUM 5000 UNIT(S): 5000 INJECTION INTRAVENOUS; SUBCUTANEOUS at 05:42

## 2022-10-21 RX ADMIN — PIPERACILLIN AND TAZOBACTAM 25 GRAM(S): 4; .5 INJECTION, POWDER, LYOPHILIZED, FOR SOLUTION INTRAVENOUS at 05:42

## 2022-10-21 RX ADMIN — LOSARTAN POTASSIUM 100 MILLIGRAM(S): 100 TABLET, FILM COATED ORAL at 05:42

## 2022-10-21 RX ADMIN — PIPERACILLIN AND TAZOBACTAM 25 GRAM(S): 4; .5 INJECTION, POWDER, LYOPHILIZED, FOR SOLUTION INTRAVENOUS at 12:54

## 2022-10-21 NOTE — DISCHARGE NOTE NURSING/CASE MANAGEMENT/SOCIAL WORK - NSDCPEFALRISK_GEN_ALL_CORE
For information on Fall & Injury Prevention, visit: [email or web address] OR  [email or web address] OR  https://www.cdc.gov/steadi/patient.html

## 2022-10-21 NOTE — DISCHARGE NOTE NURSING/CASE MANAGEMENT/SOCIAL WORK - PATIENT PORTAL LINK FT
You can access the FollowMyHealth Patient Portal offered by [facility] by registering at the following website: [email or web address]. By joining [facility]’s FollowMyHealth portal, you will also be able to view your health information using other applications (apps) compatible with our system.

## 2022-10-21 NOTE — PROGRESS NOTE ADULT - ASSESSMENT
61M with PMH of HTN, Asthma presenting to the ED with facial/oral swelling that is progressively worsening admitted with cellulitis of Neck.      #Cellulitis of neck with soft tissue edema  Worsening Leukocytosis from 14 to 19  CT neck without focal collection or abscess  c/w Zosyn Q8 as per ID  F/U w/ Blood Cx  F/U ID recs  No airway compromise at this time  Start Dialudid Q6 PRN for severe pain  ENT consult for recs    #HTN  stable  c/w losartan 100mg Q24    #Asthma   Stable  Duonebs PRN  Convert to MDI upon DC    #Healthcare Maintenance  DVT ppx: HSQ  Diet: Regular  Dispo: Med tele
61M with PMH of HTN, Asthma presenting to the ED with facial/oral swelling that is progressively worsening admitted with cellulitis of Neck.      *Cellulitis of neck with soft tissue edema  improving today   CT neck without focal collection or abscess  c/w Zosyn Q8 as per ID  blood culture neg to date   ID consult appreciated   No airway compromise at this time  Start Dialudid Q6 PRN for severe pain  ENT consult for recs    *HTN  stable  c/w losartan 100mg Q24    *Asthma   Stable  Duonebs PRN  Convert to MDI upon DC    *Healthcare Maintenance  DVT ppx: HSQ    dispo; pending ID recommendation, improving slowlly today 
62 y/o healthy man presents with progressive left sided facial/neck swelling and pain after molar extraction on 10/12. Admits to tongue swelling (touching the palate), mild difficulty swallowing, and sweats. Denies difficulty breathing, drooling, fever, chills, cough, CP. He was not receiving antibiotics as outpatient.     In the ED found to have WBC 14.5. CT Neck with soft tissue swelling involving the left anterior premandibular soft tissues extending into the submental space. Moderate edema and soft tissue swelling left submandibular space. Left submandibular gland is mildly enlarged. Diffuse anterior neck subcutaneous edema extending inferiorly to the level of the thyroid gland. No drainable loculated fluid collection.     Received one dose of piperacillin-tazobactam + vanco in the ED; now on piperacillin-tazobactam.     Impression:  Neck and face soft tissue infection s/p tooth extraction     Plan:  - Continue piperacillin-tazobactam 3.375 mg IV q8h by EI  - Uptrending WBC to 19K, but clinically better   - If leukocytosis fails to improve, would repeat CT in the upcoming days to evaluate for abscess formation   - No evidence of impending airway compromise   - Follow up BCx    Will continue to follow     Sweetie Ramirez MD 
62 y/o healthy man presents with progressive left sided facial/neck swelling and pain after molar extraction on 10/12. Admits to tongue swelling (touching the palate), mild difficulty swallowing, and sweats. Denies difficulty breathing, drooling, fever, chills, cough, CP. He was not receiving antibiotics as outpatient.     In the ED found to have WBC 14.5. CT Neck with soft tissue swelling involving the left anterior premandibular soft tissues extending into the submental space. Moderate edema and soft tissue swelling left submandibular space. Left submandibular gland is mildly enlarged. Diffuse anterior neck subcutaneous edema extending inferiorly to the level of the thyroid gland. No drainable loculated fluid collection.     Treated with piperacillin-tazobactam with excellent clinical response >> Swelling significantly improved + leukocytosis resolved.     Impression:  Neck and face soft tissue infection s/p tooth extraction     Plan:  - Can discharge on oral amoxicillin-clavulanate 875 mg po q12h for 2 weeks  - Follow up in the office with me on 11/3. Patient to call [phone] to confirm appointment.     Plan discuss with Dr. Barrera and patient.     ID will sign off. Please call with questions.     Sweetie Ramirez MD

## 2022-10-21 NOTE — DISCHARGE NOTE PROVIDER - HOSPITAL COURSE
61M with PMH of HTN, Asthma presenting to the ED with facial/oral swelling that is progressively worsening admitted with cellulitis of Neck.      *Cellulitis of neck with soft tissue edema  CT neck without focal collection or abscess  s/p Zosyn   blood culture neg to date   ID consult appreciated cleared to discharge home with 2 more weeks of Augmentin and follow up out pt     Vital Signs Last 24 Hrs  T(C): 36.4 (21 Oct 2022 10:39), Max: 36.9 (20 Oct 2022 21:00)  T(F): 97.6 (21 Oct 2022 10:39), Max: 98.4 (20 Oct 2022 21:00)  HR: 64 (21 Oct 2022 10:39) (51 - 67)  BP: 146/74 (21 Oct 2022 10:39) (126/80 - 146/74)  BP(mean): --  RR: 18 (21 Oct 2022 10:39) (16 - 18)  SpO2: 98% (21 Oct 2022 10:39) (96% - 98%)    Parameters below as of 21 Oct 2022 10:39  Patient On (Oxygen Delivery Method): room air        PHYSICAL EXAM:    Constitutional: NAD,   HEENT: PERR, EOMI,   Neck: Soft and supple,   Respiratory: Breath sounds are clear bilaterally,    Cardiovascular: S1 and S2,   Gastrointestinal: Bowel Sounds present, soft, nontender,   Extremities: No peripheral edema  Vascular: 2+ peripheral pulses  Neurological: A/O x 3, no focal deficits  Musculoskeletal: 5/5 strength b/l upper and lower extremities  Skin: left neck soft tissue swelling noted, pingpong ball in size     Time spent on discharge 36min

## 2022-10-21 NOTE — DISCHARGE NOTE PROVIDER - NSDCFUSCHEDAPPT_GEN_ALL_CORE_FT
Sweetie Ramirez  [facility]  [facility] [address]  Scheduled Appointment: 11/03/2022

## 2022-10-21 NOTE — DISCHARGE NOTE PROVIDER - NSDCMRMEDTOKEN_GEN_ALL_CORE_FT
losartan 100 mg oral tablet: 1 tab(s) orally once a day  rosuvastatin 5 mg oral tablet: 1 tab(s) orally once a day (at bedtime)  Ventolin HFA 90 mcg/inh inhalation aerosol: 2 puff(s) inhaled 4 times a day, As Needed

## 2022-10-21 NOTE — DISCHARGE NOTE PROVIDER - NSDCCPCAREPLAN_GEN_ALL_CORE_FT
PRINCIPAL DISCHARGE DIAGNOSIS  Diagnosis: Wound cellulitis  Assessment and Plan of Treatment: s/p IV zosyn   blood culture negative   Continue augmentin twice a day after food for 2 more weeks  follow up with infectious disease as out patient

## 2022-10-21 NOTE — PROGRESS NOTE ADULT - SUBJECTIVE AND OBJECTIVE BOX
[facility]  INFECTIOUS DISEASES at [place] and [place]  ===============================================================                               Joey Disla MD*     Nafisa Lamas MD*                         Mckenzie Leyva MD*       Sweetie Ramirez MD*            Diplomates American Board of Internal Medicine & Infectious Diseases                * [place] Office - Appt - Tel  [phone] Fax [phone]                * [place] Office - Appt - Tel [phone] Fax [phone]                                  Hospital Consult line:  [phone]  ==============================================================    JOSE DOMINGUEZ [id number]    Follow up: Face and neck infection s/p tooth extraction     No acute events overnight.   Leukocytosis resolved     I have personally reviewed the labs and data; pertinent labs and data are listed in this note; please see below.     _______________________________________________________________  REVIEW OF SYSTEMS  Doing well. Swelling coming down. Tolerating diet w/o issues.   ________________________________________________________________  Allergies:  No Known Allergies    ________________________________________________________________  PHYSICAL EXAM  GEN: NAD, sitting up in chair  HEENT: sublingual edema resolved. poor dentition.   NECK: submental and left sided neck edema much improved but still present  LUNGS: unlabored breathing, speaking full sentences.   PSYCHIATRIC: Appropriate affect and mood  LINES: PIV  ________________________________________________________________  Vitals:    T(F): 97.8 (21 Oct 2022 04:24), Max: 99 (20 Oct 2022 10:33)  HR: 51 (21 Oct 2022 04:24)  BP: 135/84 (21 Oct 2022 04:24)  RR: 17 (21 Oct 2022 04:24)  SpO2: 97% (21 Oct 2022 04:24) (95% - 97%)  temp max in last 48H T(F): , Max: 99 (10-20-22 @ 10:33)    Current Antibiotics:  piperacillin/tazobactam IVPB.. 3.375 Gram(s) IV Intermittent every 8 hours    Other medications:  atorvastatin 20 milliGRAM(s) Oral at bedtime  coronavirus bivalent (EUA) Booster Vaccine (PFIZER) 0.3 milliLiter(s) IntraMuscular once  heparin   Injectable 5000 Unit(s) SubCutaneous every 12 hours  influenza   Vaccine 0.5 milliLiter(s) IntraMuscular once  losartan 100 milliGRAM(s) Oral daily                            13.6   9.41  )-----------( 461      ( 21 Oct 2022 04:45 )             42.1     10-20    140  |  104  |  14.4  ----------------------------<  89  4.1   |  24.0  |  1.03    Ca    9.2      20 Oct 2022 06:23      RECENT CULTURES:  10-18 @ 16:28 .Blood Blood     No growth to date.    10-18 @ 16:25 .Blood Blood     No growth to date.    10-18 @ 09:30    RVP with SARS-CoV-2 NotDetec      WBC Count: 9.41 K/uL (10-21-22 @ 04:45)  WBC Count: 11.48 K/uL (10-20-22 @ 06:23)  WBC Count: 19.22 K/uL (10-19-22 @ 07:00)  WBC Count: 14.50 K/uL (10-18-22 @ 09:10)    Creatinine, Serum: 1.03 mg/dL (10-20-22 @ 06:23)  Creatinine, Serum: 1.00 mg/dL (10-19-22 @ 07:00)  Creatinine, Serum: 0.91 mg/dL (10-18-22 @ 09:10)     SARS-CoV-2: NotDetec (10-18-22 @ 09:30)    ________________________________________________________________  RADIOLOGY  < from: CT Neck Soft Tissue w/ IV Cont (10.18.22 @ 12:12) >  IMPRESSION:  Status post left mandibular first premolar extraction.   Moderate edema soft tissue swelling involving the left anterior   premandibular soft tissues extending into the submental space. Moderate   edema and soft tissue swelling left submandibular space. Left   submandibular gland is mildly enlarged. Diffuse anterior neck   subcutaneous edema extending inferiorly to the level of the thyroid   gland. No drainable loculated fluid collection. Findings likely   represents diffuse cellulitis. No prevertebral effusion. Consider   follow-up CT imaging in 5-7 days with contrast as clinically warranted.    < end of copied text >  
[facility] Division of Hospital Medicine    Chief Complaint:  Cellulitis of the neck    SUBJECTIVE / OVERNIGHT EVENTS:  Admitted to medicine yesterday. Started on IV zosyn by ID. Pt seen at bedside, in mild distress due to pain in jaw. Patient denies chest pain, SOB, abd pain, N/V, fever, chills, dysuria or any other complaints. All remainder ROS negative.     MEDICATIONS  (STANDING):  atorvastatin 20 milliGRAM(s) Oral at bedtime  coronavirus bivalent (EUA) Booster Vaccine (PFIZER) 0.3 milliLiter(s) IntraMuscular once  heparin   Injectable 5000 Unit(s) SubCutaneous every 12 hours  influenza   Vaccine 0.5 milliLiter(s) IntraMuscular once  losartan 100 milliGRAM(s) Oral daily  piperacillin/tazobactam IVPB.. 3.375 Gram(s) IV Intermittent every 8 hours    MEDICATIONS  (PRN):  acetaminophen     Tablet .. 650 milliGRAM(s) Oral every 6 hours PRN Temp greater or equal to 38C (100.4F), Mild Pain (1 - 3)  aluminum hydroxide/magnesium hydroxide/simethicone Suspension 30 milliLiter(s) Oral every 4 hours PRN Dyspepsia  HYDROmorphone  Injectable 1 milliGRAM(s) IV Push every 6 hours PRN Severe Pain (7 - 10)  melatonin 3 milliGRAM(s) Oral at bedtime PRN Insomnia  ondansetron Injectable 4 milliGRAM(s) IV Push every 8 hours PRN Nausea and/or Vomiting        I&O's Summary      PHYSICAL EXAM:  Vital Signs Last 24 Hrs  T(C): 36.6 (19 Oct 2022 10:40), Max: 36.9 (18 Oct 2022 15:37)  T(F): 97.8 (19 Oct 2022 10:40), Max: 98.5 (18 Oct 2022 15:37)  HR: 66 (19 Oct 2022 10:40) (61 - 82)  BP: 157/82 (19 Oct 2022 10:40) (144/68 - 205/81)  BP(mean): --  RR: 18 (19 Oct 2022 10:40) (16 - 18)  SpO2: 95% (19 Oct 2022 10:40) (92% - 97%)    Parameters below as of 19 Oct 2022 10:40  Patient On (Oxygen Delivery Method): room air      GENERAL: NAD, lying in bed comfortably  HEAD:  Atraumatic, Normocephalic  EYES: EOMI, PERRLA, conjunctiva and sclera clear  ENT: Poor dentition, MMM  NECK: Swollen L side of neck and jaw, tender to touch  CHEST/LUNG: Clear to auscultation bilaterally; No rales, rhonchi, wheezing, or rubs. Unlabored respirations  HEART: Regular rate and rhythm; No murmurs, rubs, or gallops  ABDOMEN: BSx4; Soft, nontender, nondistended  EXTREMITIES:  2+ Peripheral Pulses, brisk capillary refill. No clubbing, cyanosis, or edema  NERVOUS SYSTEM:  A&Ox3, no focal deficits   SKIN: No rashes or lesions  PSYCH: Normal affect, euthymic mood    LABS:                        14.3   19.22 )-----------( 478      ( 19 Oct 2022 07:00 )             42.6     10-19    140  |  101  |  12.5  ----------------------------<  91  3.9   |  25.0  |  1.00    Ca    9.8      19 Oct 2022 07:00  Phos  2.4     10-19  Mg     2.2     10-19    TPro  7.4  /  Alb  4.1  /  TBili  0.6  /  DBili  x   /  AST  19  /  ALT  17  /  AlkPhos  81  10-18              CAPILLARY BLOOD GLUCOSE            RADIOLOGY & ADDITIONAL TESTS:  Results Reviewed: y  Imaging Personally Reviewed: y  Electrocardiogram Personally Reviewed: n                                          
HPI  Pt is a 61yo M admited to [facility] for cellulitis   Pt was seen and examined at bedside. Pt states swelling has significant decreased, however still have changing voice due to swelling. Denies difficulty breathing     Vital Signs Last 24 Hrs  T(C): 37.2 (20 Oct 2022 10:33), Max: 37.2 (20 Oct 2022 10:33)  T(F): 99 (20 Oct 2022 10:33), Max: 99 (20 Oct 2022 10:33)  HR: 95 (20 Oct 2022 10:33) (58 - 95)  BP: 112/73 (20 Oct 2022 10:33) (112/73 - 150/91)  BP(mean): --  RR: 17 (20 Oct 2022 10:33) (16 - 18)  SpO2: 95% (20 Oct 2022 10:33) (95% - 97%)    Parameters below as of 20 Oct 2022 10:33  Patient On (Oxygen Delivery Method): room air        I&O's Summary      CAPILLARY BLOOD GLUCOSE          PHYSICAL EXAM:    Constitutional: NAD,   HEENT: PERR, EOMI,   Neck: Soft and supple,   Respiratory: Breath sounds are clear bilaterally,    Cardiovascular: S1 and S2,   Gastrointestinal: Bowel Sounds present, soft, nontender,   Extremities: No peripheral edema  Vascular: 2+ peripheral pulses  Neurological: A/O x 3, no focal deficits  Musculoskeletal: 5/5 strength b/l upper and lower extremities  Skin: left neck soft tissue swelling noted, orange in size     MEDICATIONS:  MEDICATIONS  (STANDING):  atorvastatin 20 milliGRAM(s) Oral at bedtime  coronavirus bivalent (EUA) Booster Vaccine (PFIZER) 0.3 milliLiter(s) IntraMuscular once  heparin   Injectable 5000 Unit(s) SubCutaneous every 12 hours  influenza   Vaccine 0.5 milliLiter(s) IntraMuscular once  losartan 100 milliGRAM(s) Oral daily  piperacillin/tazobactam IVPB.. 3.375 Gram(s) IV Intermittent every 8 hours      LABS: All Labs Reviewed:                        13.5   11.48 )-----------( 428      ( 20 Oct 2022 06:23 )             40.5     10-20    140  |  104  |  14.4  ----------------------------<  89  4.1   |  24.0  |  1.03    Ca    9.2      20 Oct 2022 06:23  Phos  2.4     10-19  Mg     2.2     10-19            Blood Culture: 10-18 @ 16:28  Organism --  Gram Stain Blood -- Gram Stain --  Specimen Source .Blood Blood  Culture-Blood --    10-18 @ 16:25  Organism --  Gram Stain Blood -- Gram Stain --  Specimen Source .Blood Blood  Culture-Blood --        RADIOLOGY/EKG:    DVT PPX:    ADVANCED DIRECTIVE:    DISPOSITION:
[facility]  INFECTIOUS DISEASES at [place] and [place]  ===============================================================                               Joey Disla MD*     Nafisa Lamas MD*                         Mckenzie Leyva MD*       Sweetie Ramirez MD*            Diplomates American Board of Internal Medicine & Infectious Diseases                * [place] Office - Appt - Tel  [phone] Fax [phone]                * [place] Office - Appt - Tel [phone] Fax [phone]                                  Hospital Consult line:  [phone]  ==============================================================    JOSE DOMINGUEZ [id number]    Follow up: Face/neck cellulitis s/p tooth extraction     Uptrending WBC, but swelling improving  Tolerating diet  Afebrile, HD stable     I have personally reviewed the labs and data; pertinent labs and data are listed in this note; please see below.     _______________________________________________________________  REVIEW OF SYSTEMS  Feeling much better. Odynophagia and dysphagia improving. No difficulty breathing, stridor or drooling. Tolerating diet.   ________________________________________________________________  Allergies:  No Known Allergies    ________________________________________________________________  PHYSICAL EXAM  GEN: NAD, lying in bed.   HEAD and NECK: Marked submandibular and left sided neck swelling, TTP. Sublingual edema much improved.   LUNGS: speaking full sentences. Unlabored breathing   NEUROLOGIC: Grossly no focal deficits   PSYCHIATRIC: Appropriate affect and mood  SKIN: No rash, wounds or jaundice  LINES: PIV  ________________________________________________________________  Vitals:    T(F): 97.8 (19 Oct 2022 10:40), Max: 98.5 (18 Oct 2022 15:37)  HR: 66 (19 Oct 2022 10:40)  BP: 157/82 (19 Oct 2022 10:40)  RR: 18 (19 Oct 2022 10:40)  SpO2: 95% (19 Oct 2022 10:40) (92% - 97%)  temp max in last 48H T(F): , Max: 98.6 (10-18-22 @ 08:21)    Current Antibiotics:  piperacillin/tazobactam IVPB.. 3.375 Gram(s) IV Intermittent every 8 hours    Other medications:  atorvastatin 20 milliGRAM(s) Oral at bedtime  coronavirus bivalent (EUA) Booster Vaccine (PFIZER) 0.3 milliLiter(s) IntraMuscular once  heparin   Injectable 5000 Unit(s) SubCutaneous every 12 hours  influenza   Vaccine 0.5 milliLiter(s) IntraMuscular once  losartan 100 milliGRAM(s) Oral daily                            14.3   19.22 )-----------( 478      ( 19 Oct 2022 07:00 )             42.6     10-19    140  |  101  |  12.5  ----------------------------<  91  3.9   |  25.0  |  1.00    Ca    9.8      19 Oct 2022 07:00  Phos  2.4     10-19  Mg     2.2     10-19    TPro  7.4  /  Alb  4.1  /  TBili  0.6  /  DBili  x   /  AST  19  /  ALT  17  /  AlkPhos  81  10-18    RECENT CULTURES:  10/19 BCx - pending     10-18 @ 09:30    RVP with SARS-CoV-2 NotDetec      WBC Count: 19.22 K/uL (10-19-22 @ 07:00)  WBC Count: 14.50 K/uL (10-18-22 @ 09:10)    Creatinine, Serum: 1.00 mg/dL (10-19-22 @ 07:00)  Creatinine, Serum: 0.91 mg/dL (10-18-22 @ 09:10)    SARS-CoV-2: NotDetec (10-18-22 @ 09:30)    ________________________________________________________________  RADIOLOGY  < from: CT Neck Soft Tissue w/ IV Cont (10.18.22 @ 12:12) >  IMPRESSION:  Status post left mandibular first premolar extraction.   Moderate edema soft tissue swelling involving the left anterior   premandibular soft tissues extending into the submental space. Moderate   edema and soft tissue swelling left submandibular space. Left   submandibular gland is mildly enlarged. Diffuse anterior neck   subcutaneous edema extending inferiorly to the level of the thyroid   gland. No drainable loculated fluid collection. Findings likely   represents diffuse cellulitis. No prevertebral effusion. Consider   follow-up CT imaging in 5-7 days with contrast as clinically warranted.    < end of copied text >

## 2022-10-23 LAB
CULTURE RESULTS: SIGNIFICANT CHANGE UP
CULTURE RESULTS: SIGNIFICANT CHANGE UP
SPECIMEN SOURCE: SIGNIFICANT CHANGE UP
SPECIMEN SOURCE: SIGNIFICANT CHANGE UP

## 2022-10-29 NOTE — CDI QUERY NOTE - NSCDIOTHERTXTBX_GEN_ALL_CORE_HH
This patient is documented with cellulitis of the face and neck s/p recent molar extraction on 10/12.  He reports tongue swelling, sweats, and mild difficulty swallowing.  The D/C note documents a wound infection.    Can the etiology of the cellulitis in relation to the recent procedure be clarified, after study?    -Cellulitis of the face and neck was a suspected postprocedural infection related to recent molar extraction  -Cellulitis of the face and neck is unrelated to recent molar extraction   -Other (please specify)  -Not clinically significant    Chart documentation:      D/C note:  *Cellulitis of neck with soft tissue edema  CT neck without focal collection or abscess  s/p Zosyn   blood culture neg to date   ID consult appreciated cleared...  PRINCIPAL DISCHARGE DIAGNOSIS  Diagnosis: Wound cellulitis      10/18 ID Consult:  · Reason for Referral/Consultation:  Facial/neck cellulitis s/p tooth extraction  HPI: 62 y/o healthy man presents with progressive left sided facial/neck swelling and pain after molar extraction on 10/12. Admits to tongue swelling (touching the palate), mild difficulty swallowing, and sweats.  Assessment  62 y/o healthy man presents with progressive left sided facial/neck swelling and pain after molar extraction on 10/12. Admits to tongue swelling (touching the palate), mild difficulty swallowing, and sweats. Denies difficulty breathing, drooling, fever, chills, cough, CP. He was not receiving antibiotics as outpatient.   In the ED found to have WBC 14.5. CT Neck with soft tissue swelling involving the left anterior premandibular soft tissues extending into the submental space. Moderate edema and soft tissue swelling left submandibular space. Left submandibular gland is mildly enlarged. Diffuse anterior neck subcutaneous edema extending inferiorly to the level of the thyroid gland. No drainable loculated fluid collection.       H&P:  History of Present Illness:   Patient states had tooth extracted last Wednesday since that time noticed that he was having persistent jaw pain at that site. Started developing neck, soft tissue swelling on Sunday. Woke up this morning with worsening neck swelling and pressure under his tongue.  · Assessment  61M with PMH of HTN, Asthma presenting to the ED with facial/oral swelling that is progressively worsening admitted with cellulitis of Neck.  Cellulitis of neck with soft tissue edema  Leukocytisis

## 2022-11-03 ENCOUNTER — APPOINTMENT (OUTPATIENT)
Dept: INTERNAL MEDICINE | Facility: CLINIC | Age: 62
End: 2022-11-03

## 2022-11-03 VITALS
TEMPERATURE: 97.2 F | OXYGEN SATURATION: 98 % | DIASTOLIC BLOOD PRESSURE: 79 MMHG | HEIGHT: 70 IN | RESPIRATION RATE: 15 BRPM | HEART RATE: 63 BPM | WEIGHT: 191 LBS | BODY MASS INDEX: 27.35 KG/M2 | SYSTOLIC BLOOD PRESSURE: 135 MMHG

## 2022-11-03 PROCEDURE — 99214 OFFICE O/P EST MOD 30 MIN: CPT

## 2022-11-05 NOTE — DATA REVIEWED
[FreeTextEntry1] : < from: CT Neck Soft Tissue w/ IV Cont (10.18.22 @ 12:12) >\par IMPRESSION:  Status post left mandibular first premolar extraction. \par Moderate edema soft tissue swelling involving the left anterior \par premandibular soft tissues extending into the submental space. Moderate \par edema and soft tissue swelling left submandibular space. Left \par submandibular gland is mildly enlarged. Diffuse anterior neck \par subcutaneous edema extending inferiorly to the level of the thyroid \par gland. No drainable loculated fluid collection. Findings likely \par represents diffuse cellulitis. No prevertebral effusion. Consider \par follow-up CT imaging in 5-7 days with contrast as clinically warranted.

## 2022-11-05 NOTE — REASON FOR VISIT
[Post Hospitalization] : a post hospitalization visit [FreeTextEntry1] : Neck and facial soft tissue infection s/o tooth extraction

## 2022-11-05 NOTE — ASSESSMENT
[FreeTextEntry1] : 60 y/o healthy man presented to [facility] ED on 10/18 with progressive left sided facial/neck swelling and pain after molar extraction on 10/12. He had tongue swelling, mild difficulty swallowing, and sweats. He denies difficulty breathing, drooling, fever, chills, cough, CP. He did not receive antibiotics as outpatient. \par \par In the ED found to have WBC 14.5. CT Neck with soft tissue swelling involving the left anterior premandibular soft tissues extending into the submental space. Moderate edema and soft tissue swelling left submandibular space. Left submandibular gland is mildly enlarged. Diffuse anterior neck subcutaneous edema extending inferiorly to the level of the thyroid gland. No drainable loculated fluid collection. \par \par Treated with piperacillin-tazobactam with excellent clinical response and discharged on amoxicillin-clavulanate 875 mg PO q12h which he completed a couple of days ago. \par \par Symptoms have entirely resolved. He is planning to have a another tooth extraction in te near future. Augmentin prescription sent to pharmacy to be used if needed after oral surgery. \par \par All question answered. \par \par Follow up as needed.

## 2022-11-05 NOTE — HISTORY OF PRESENT ILLNESS
[FreeTextEntry1] : 62 y/o healthy man presented to [facility] ED on 10/18 with progressive left sided facial/neck swelling and pain after molar extraction on 10/12. He had tongue swelling, mild difficulty swallowing, and sweats. He denies difficulty breathing, drooling, fever, chills, cough, CP. He did not receive antibiotics as outpatient. \par \par In the ED found to have WBC 14.5. CT Neck with soft tissue swelling involving the left anterior premandibular soft tissues extending into the submental space. Moderate edema and soft tissue swelling left submandibular space. Left submandibular gland is mildly enlarged. Diffuse anterior neck subcutaneous edema extending inferiorly to the level of the thyroid gland. No drainable loculated fluid collection. \par \par Treated with piperacillin-tazobactam with excellent clinical response and discharged on amoxicillin-clavulanate 875 mg PO q12h for 2 more weeks. \par \par Interim history:\par 11/3/2022 - comes for a scheduled follow up post-hospital discharged. Finished the Augmentin a couple of days ago. Face, neck and oral cavity have all returned to normal. No fever or chills. Experienced mild stomach upset and diarrhea while on Augmentin.

## 2022-11-05 NOTE — PHYSICAL EXAM
[General Appearance - In No Acute Distress] : in no acute distress [General Appearance - Alert] : alert [Sclera] : the sclera and conjunctiva were normal [PERRL With Normal Accommodation] : pupils were equal in size, round, reactive to light [Extraocular Movements] : extraocular movements were intact [Outer Ear] : the ears and nose were normal in appearance [Examination Of The Oral Cavity] : the lips and gums were normal [Oropharynx] : the oropharynx was normal with no thrush [Neck Appearance] : the appearance of the neck was normal [] : the neck was supple [Neck Cervical Mass (___cm)] : no neck mass was observed [Jugular Venous Distention Increased] : there was no jugular-venous distention [Thyroid Diffuse Enlargement] : the thyroid was not enlarged [FreeTextEntry1] : No swelling [Oriented To Time, Place, And Person] : oriented to person, place, and time [Affect] : the affect was normal

## 2023-02-13 NOTE — ED ADULT NURSE NOTE - PAIN RATING/NUMBER SCALE (0-10): REST
Patient is having an operation to remove a small amount of skin cancer and would like to discuss the medication clopidogrel (PLAVIX) 75 MG tablet prior to having this completed. Please call patient.   10

## 2023-06-16 NOTE — CONSULT NOTE ADULT - CONSULT REASON
Patient: Ciaran Castanon Date of Service: 23   : 1985 MRN: [id number]     SUBJECTIVE:         HISTORY OF PRESENT ILLNESS:  Ciaran Castanon is a 37 year old male who presents today for f/u on weight    Insurance did not cover any of the GLP1RA medications  Unsure if he would like to try Metformin or phentermine   Still doing shift work   No prospect of schedule changing any time soon    Cut out pop  Still drinking some juice and sweetened beverages  Exercising a couple times per week  Still very active at work     Was having L foot pain, but improved with change in footwear  Sometimes legs, knees are painful as well  Mostly manageable with OTC meds and rest   Hasn't started Vitamin D yet         PAST MEDICAL HISTORY:  Past Medical History:   Diagnosis Date   • Strain of gastrocnemius muscle, left, initial encounter 2022     Patient Active Problem List   Diagnosis   • Class 3 severe obesity due to excess calories without serious comorbidity with body mass index (BMI) of 45.0 to 49.9 in adult (CMD)   • History of 2019 novel coronavirus disease (COVID-19)   • COVID-19 vaccination refused   • Refused influenza vaccine   • Healthcare maintenance   • Vitamin D deficiency   • Other hyperlipidemia   • Transaminitis       PAST SURGICAL HISTORY:  No past surgical history on file.    FAMILY HISTORY:  Family History   Problem Relation Age of Onset   • Hypertension Mother    • Diabetes Father    • Obesity Father    • Myocardial Infarction Father    • Obesity Sister    • Cancer Maternal Grandmother    • Myocardial Infarction Maternal Grandfather 64       SOCIAL HISTORY:  Social History     Tobacco Use   • Smoking status: Never   • Smokeless tobacco: Never   Vaping Use   • Vaping Use: never used   Substance Use Topics   • Alcohol use: Yes     Comment: Socially        MEDICATIONS:  Current Outpatient Medications   Medication Sig   • metFORMIN (GLUCOPHAGE) 500 MG tablet Take 1 tablet by mouth in the morning and 1  tablet in the evening. Take with meals.   • semaglutide-Weight Management (WEGOVY) 0.25 MG/0.5ML injection Inject 0.25 mg into the skin every 7 days.   • Cholecalciferol 1.25 mg (50,000 units) capsule Take 1 capsule by mouth 1 day a week.   • triamcinolone (ARISTOCORT) 0.1 % cream APPLY  AND RUB  IN A THIN FILM TO AFFECTED AREAS TWICE DAILY.(AM AND PM)   • albuterol (PROAIR HFA) 108 (90 Base) MCG/ACT inhaler INHALE 2 PUFFS BY MOUTH EVERY 4 TO 6 HOURS AS NEEDED   • fexofenadine (ALLEGRA) 180 MG tablet TAKE 1 TABLET DAILY AS NEEDED FOR ALLERGIES     No current facility-administered medications for this visit.       ALLERGIES:  ALLERGIES:   Allergen Reactions   • Cats Claw (Uncaria Tomentosa) Other (See Comments)     Unknown       Patient's medications, allergies, problem list, past medical, surgical, social and family histories were reviewed and updated as appropriate.    ROS per HPI     OBJECTIVE:     Vitals:    06/16/23 0823   BP: 118/76   BP Location: LUE - Left upper extremity   Patient Position: Sitting   Cuff Size: Large Adult   Pulse: 78   Resp: 16   Temp: 97.7 °F (36.5 °C)   TempSrc: Oral   SpO2: 100%   Weight: (!) 164.9 kg (363 lb 8.6 oz)   Height: 6' 3\" (1.905 m)   PainSc:  0       Physical Exam  Vitals and nursing note reviewed.   Constitutional:       General: He is not in acute distress.     Appearance: He is not diaphoretic.   HENT:      Head: Normocephalic and atraumatic.   Eyes:      General: No scleral icterus.        Right eye: No discharge.         Left eye: No discharge.      Conjunctiva/sclera: Conjunctivae normal.   Pulmonary:      Effort: Pulmonary effort is normal.   Skin:     General: Skin is warm and dry.   Neurological:      Mental Status: He is alert and oriented to person, place, and time.      Cranial Nerves: No cranial nerve deficit.      Motor: No abnormal muscle tone.      Coordination: Coordination normal.      Gait: Gait is intact.   Psychiatric:         Mood and Affect: Mood and  affect normal.         Cognition and Memory: Memory normal.         Judgment: Judgment normal.         Most Recent Immunizations   Administered Date(s) Administered   • DPT 11/03/1993   • MMR 11/03/1993   • Mumps 03/08/1990   • Polio, Oral 11/14/1992       ASSESSMENT AND PLAN:     Problem List Items Addressed This Visit        Cardiac and Vasculature    Other hyperlipidemia     - Discussed dietary changes after last visit - encouraged to continue and will recheck in 3 months          Relevant Orders    Lipid Panel With Reflex       Endocrine and Metabolic    Class 3 severe obesity due to excess calories without serious comorbidity with body mass index (BMI) of 45.0 to 49.9 in adult (CMD) - Primary     - Unable to afford GLP1RA medications   - Hold off on phentermine because of possible cardiac effects  - Trial of Metformin   - Encouraged to continue to implement small dietary/exercise changes  - f/u 3 months or sooner as needed          Relevant Medications    metFORMIN (GLUCOPHAGE) 500 MG tablet    Vitamin D deficiency     - Encouraged to start VitD that was sent to pharmacy   - Recheck 3 months             Gastrointestinal and Abdominal    Transaminitis     - Likely 2/2 NAFLD  - Encouraged in dietary and exercise changes  - Will recheck 3 months and if still elevated, check liver US and further workup to r/o other conditions          Relevant Orders    Comprehensive Metabolic Panel          Health Maintenance Summary     Hepatitis B Vaccine (1 of 3 - 3-dose series)  Overdue - never done    COVID-19 Vaccine (1)  Overdue - never done    Varicella Vaccine (1 of 2 - 2-dose childhood series)  Overdue - never done    DTaP/Tdap/Td Vaccine (6 - Tdap)  Overdue since 12/1/1998    Influenza Vaccine (Season Ended)  Next due on 9/1/2023    Depression Screening (Yearly)  Next due on 6/16/2024    Meningococcal Vaccine   Aged Out    HPV Vaccine   Aged Out    Pneumococcal Vaccine 0-64   Aged Out          Schedule follow up: Return  Facial/neck cellulitis s/p tooth extraction in about 3 months (around 9/16/2023).    Dalila Sawyer, DO

## 2023-09-25 ENCOUNTER — APPOINTMENT (OUTPATIENT)
Dept: SURGERY | Facility: CLINIC | Age: 63
End: 2023-09-25
Payer: MEDICAID

## 2023-09-25 VITALS
RESPIRATION RATE: 15 BRPM | SYSTOLIC BLOOD PRESSURE: 106 MMHG | DIASTOLIC BLOOD PRESSURE: 66 MMHG | HEART RATE: 56 BPM | BODY MASS INDEX: 26.63 KG/M2 | WEIGHT: 186 LBS | TEMPERATURE: 97.7 F | OXYGEN SATURATION: 97 % | HEIGHT: 70 IN

## 2023-09-25 DIAGNOSIS — Z87.19 OTHER SPECIFIED POSTPROCEDURAL STATES: ICD-10-CM

## 2023-09-25 DIAGNOSIS — Z98.890 OTHER SPECIFIED POSTPROCEDURAL STATES: ICD-10-CM

## 2023-09-25 DIAGNOSIS — R10.32 LEFT LOWER QUADRANT PAIN: ICD-10-CM

## 2023-09-25 PROCEDURE — 99213 OFFICE O/P EST LOW 20 MIN: CPT

## 2023-11-30 ENCOUNTER — APPOINTMENT (OUTPATIENT)
Dept: INTERNAL MEDICINE | Facility: CLINIC | Age: 63
End: 2023-11-30
Payer: MEDICAID

## 2023-11-30 DIAGNOSIS — Z23 ENCOUNTER FOR IMMUNIZATION: ICD-10-CM

## 2023-11-30 DIAGNOSIS — K04.7 PERIAPICAL ABSCESS W/OUT SINUS: ICD-10-CM

## 2023-11-30 DIAGNOSIS — J45.909 UNSPECIFIED ASTHMA, UNCOMPLICATED: ICD-10-CM

## 2023-11-30 DIAGNOSIS — Z00.00 ENCOUNTER FOR GENERAL ADULT MEDICAL EXAMINATION W/OUT ABNORMAL FINDINGS: ICD-10-CM

## 2023-11-30 DIAGNOSIS — L08.9 LOCAL INFECTION OF THE SKIN AND SUBCUTANEOUS TISSUE, UNSPECIFIED: ICD-10-CM

## 2023-11-30 PROCEDURE — 90677 PCV20 VACCINE IM: CPT

## 2023-11-30 PROCEDURE — 99204 OFFICE O/P NEW MOD 45 MIN: CPT | Mod: 25

## 2023-11-30 PROCEDURE — 90662 IIV NO PRSV INCREASED AG IM: CPT

## 2023-11-30 PROCEDURE — 90472 IMMUNIZATION ADMIN EACH ADD: CPT

## 2023-11-30 PROCEDURE — G0009: CPT | Mod: 59

## 2023-12-01 LAB
ALBUMIN SERPL ELPH-MCNC: 4.6 G/DL
ALP BLD-CCNC: 87 U/L
ALT SERPL-CCNC: 31 U/L
ANION GAP SERPL CALC-SCNC: 12 MMOL/L
AST SERPL-CCNC: 32 U/L
BASOPHILS # BLD AUTO: 0.04 K/UL
BASOPHILS NFR BLD AUTO: 0.5 %
BILIRUB SERPL-MCNC: 0.2 MG/DL
BUN SERPL-MCNC: 14 MG/DL
CALCIUM SERPL-MCNC: 10 MG/DL
CHLORIDE SERPL-SCNC: 105 MMOL/L
CO2 SERPL-SCNC: 23 MMOL/L
CREAT SERPL-MCNC: 1.17 MG/DL
EGFR: 71 ML/MIN/1.73M2
EOSINOPHIL # BLD AUTO: 0.67 K/UL
EOSINOPHIL NFR BLD AUTO: 8.2 %
GLUCOSE SERPL-MCNC: 86 MG/DL
HCT VFR BLD CALC: 46.2 %
HGB BLD-MCNC: 14.3 G/DL
IMM GRANULOCYTES NFR BLD AUTO: 0.2 %
LYMPHOCYTES # BLD AUTO: 1.93 K/UL
LYMPHOCYTES NFR BLD AUTO: 23.5 %
MAN DIFF?: NORMAL
MCHC RBC-ENTMCNC: 28.8 PG
MCHC RBC-ENTMCNC: 31 GM/DL
MCV RBC AUTO: 93 FL
MONOCYTES # BLD AUTO: 0.71 K/UL
MONOCYTES NFR BLD AUTO: 8.6 %
NEUTROPHILS # BLD AUTO: 4.84 K/UL
NEUTROPHILS NFR BLD AUTO: 59 %
PLATELET # BLD AUTO: 300 K/UL
POTASSIUM SERPL-SCNC: 4.2 MMOL/L
PROT SERPL-MCNC: 7.3 G/DL
RBC # BLD: 4.97 M/UL
RBC # FLD: 14.3 %
SODIUM SERPL-SCNC: 140 MMOL/L
WBC # FLD AUTO: 8.21 K/UL

## 2023-12-04 LAB
ESTIMATED AVERAGE GLUCOSE: 114 MG/DL
HBA1C MFR BLD HPLC: 5.6 %
TSH SERPL-ACNC: 1.72 UIU/ML

## 2023-12-07 PROBLEM — Z23 ENCOUNTER FOR IMMUNIZATION: Status: ACTIVE | Noted: 2023-11-30 | Resolved: 2023-12-14

## 2023-12-07 PROBLEM — L08.9 SOFT TISSUE INFECTION: Status: RESOLVED | Noted: 2022-11-05 | Resolved: 2023-12-07

## 2023-12-07 PROBLEM — K04.7 DENTAL INFECTION: Status: RESOLVED | Noted: 2022-11-03 | Resolved: 2023-12-07

## 2023-12-07 RX ORDER — AMOXICILLIN AND CLAVULANATE POTASSIUM 875; 125 MG/1; MG/1
875-125 TABLET, COATED ORAL
Qty: 10 | Refills: 1 | Status: COMPLETED | COMMUNITY
Start: 2022-11-03 | End: 2023-12-07

## 2023-12-07 RX ORDER — ERGOCALCIFEROL (VITAMIN D2) 1250 MCG
50000 CAPSULE ORAL
Refills: 0 | Status: COMPLETED | COMMUNITY
End: 2023-12-07

## 2024-08-13 NOTE — PATIENT PROFILE ADULT - NSPROIMPLANTSMEDDEV_GEN_A_NUR
Patient requested review of pre procedure medication hold. Instructions printed and given to patient. Reviewed with patient and he repeated back instructions for hold of Eliquis and medication hold on Thursday.    Surgical pins in left shoulder

## 2024-09-13 NOTE — ED PROVIDER NOTE - DR. NAME
[Nutrition/ Exercise/ Weight Management] : nutrition, exercise, weight management [Breast Self Exam] : breast self exam [Contraception/ Emergency Contraception/ Safe Sexual Practices] : contraception, emergency contraception, safe sexual practices [Preconception Care/ Fertility options] : preconception care, fertility options [Pregnancy Options] : pregnancy options [Confidentiality] : confidentiality [STD (testing, results, tx)] : STD (testing, results, tx) [Lab Results] : lab results [Medication Management] : medication management Nilda